# Patient Record
Sex: MALE | Race: WHITE | NOT HISPANIC OR LATINO | Employment: UNEMPLOYED | URBAN - NONMETROPOLITAN AREA
[De-identification: names, ages, dates, MRNs, and addresses within clinical notes are randomized per-mention and may not be internally consistent; named-entity substitution may affect disease eponyms.]

---

## 2020-06-12 ENCOUNTER — HOSPITAL ENCOUNTER (EMERGENCY)
Facility: HOSPITAL | Age: 40
Discharge: PSYCHIATRIC OR SUBSTANCE USE FACILITY | End: 2020-06-13
Attending: FAMILY MEDICINE | Admitting: FAMILY MEDICINE

## 2020-06-12 DIAGNOSIS — F10.920 ALCOHOLIC INTOXICATION WITHOUT COMPLICATION (HCC): Primary | ICD-10-CM

## 2020-06-12 PROCEDURE — 99285 EMERGENCY DEPT VISIT HI MDM: CPT

## 2020-06-12 RX ORDER — SODIUM CHLORIDE 0.9 % (FLUSH) 0.9 %
10 SYRINGE (ML) INJECTION AS NEEDED
Status: DISCONTINUED | OUTPATIENT
Start: 2020-06-12 | End: 2020-06-13 | Stop reason: HOSPADM

## 2020-06-13 ENCOUNTER — HOSPITAL ENCOUNTER (INPATIENT)
Facility: HOSPITAL | Age: 40
LOS: 1 days | Discharge: LEFT BEFORE DISCHARGE | End: 2020-06-14
Attending: PSYCHIATRY & NEUROLOGY | Admitting: PSYCHIATRY & NEUROLOGY

## 2020-06-13 VITALS
OXYGEN SATURATION: 98 % | RESPIRATION RATE: 19 BRPM | BODY MASS INDEX: 28 KG/M2 | SYSTOLIC BLOOD PRESSURE: 131 MMHG | WEIGHT: 200 LBS | DIASTOLIC BLOOD PRESSURE: 86 MMHG | HEIGHT: 71 IN | TEMPERATURE: 98.6 F | HEART RATE: 112 BPM

## 2020-06-13 DIAGNOSIS — F10.20 ALCOHOL USE DISORDER, SEVERE, DEPENDENCE (HCC): Primary | ICD-10-CM

## 2020-06-13 PROBLEM — F10.10 ALCOHOL ABUSE: Status: ACTIVE | Noted: 2020-06-13

## 2020-06-13 LAB
6-ACETYL MORPHINE: NEGATIVE
ALBUMIN SERPL-MCNC: 3.88 G/DL (ref 3.5–5.2)
ALBUMIN/GLOB SERPL: 0.9 G/DL
ALP SERPL-CCNC: 184 U/L (ref 39–117)
ALT SERPL W P-5'-P-CCNC: 75 U/L (ref 1–41)
AMPHET+METHAMPHET UR QL: NEGATIVE
ANION GAP SERPL CALCULATED.3IONS-SCNC: 15.5 MMOL/L (ref 5–15)
AST SERPL-CCNC: 173 U/L (ref 1–40)
BARBITURATES UR QL SCN: NEGATIVE
BASOPHILS # BLD AUTO: 0.13 10*3/MM3 (ref 0–0.2)
BASOPHILS NFR BLD AUTO: 2.3 % (ref 0–1.5)
BENZODIAZ UR QL SCN: NEGATIVE
BILIRUB SERPL-MCNC: 0.9 MG/DL (ref 0.2–1.2)
BILIRUB UR QL STRIP: NEGATIVE
BUN BLD-MCNC: 4 MG/DL (ref 6–20)
BUN/CREAT SERPL: 6.3 (ref 7–25)
BUPRENORPHINE SERPL-MCNC: NEGATIVE NG/ML
CALCIUM SPEC-SCNC: 8.9 MG/DL (ref 8.6–10.5)
CANNABINOIDS SERPL QL: NEGATIVE
CHLORIDE SERPL-SCNC: 100 MMOL/L (ref 98–107)
CLARITY UR: CLEAR
CO2 SERPL-SCNC: 22.5 MMOL/L (ref 22–29)
COCAINE UR QL: NEGATIVE
COLOR UR: YELLOW
CREAT BLD-MCNC: 0.64 MG/DL (ref 0.76–1.27)
DEPRECATED RDW RBC AUTO: 48.8 FL (ref 37–54)
EOSINOPHIL # BLD AUTO: 0.04 10*3/MM3 (ref 0–0.4)
EOSINOPHIL NFR BLD AUTO: 0.7 % (ref 0.3–6.2)
ERYTHROCYTE [DISTWIDTH] IN BLOOD BY AUTOMATED COUNT: 13.2 % (ref 12.3–15.4)
ETHANOL BLD-MCNC: 131 MG/DL (ref 0–10)
ETHANOL BLD-MCNC: 299 MG/DL (ref 0–10)
ETHANOL BLD-MCNC: 412 MG/DL (ref 0–10)
ETHANOL BLD-MCNC: 76 MG/DL (ref 0–10)
ETHANOL UR QL: 0.08 %
ETHANOL UR QL: 0.13 %
ETHANOL UR QL: 0.3 %
ETHANOL UR QL: 0.41 %
GFR SERPL CREATININE-BSD FRML MDRD: 138 ML/MIN/1.73
GLOBULIN UR ELPH-MCNC: 4.1 GM/DL
GLUCOSE BLD-MCNC: 100 MG/DL (ref 65–99)
GLUCOSE UR STRIP-MCNC: NEGATIVE MG/DL
HAV IGM SERPL QL IA: ABNORMAL
HBV CORE IGM SERPL QL IA: ABNORMAL
HBV SURFACE AG SERPL QL IA: ABNORMAL
HCT VFR BLD AUTO: 46.4 % (ref 37.5–51)
HCV AB SER DONR QL: REACTIVE
HGB BLD-MCNC: 15.9 G/DL (ref 13–17.7)
HGB UR QL STRIP.AUTO: NEGATIVE
HIV1+2 AB SER QL: REACTIVE
IMM GRANULOCYTES # BLD AUTO: 0.02 10*3/MM3 (ref 0–0.05)
IMM GRANULOCYTES NFR BLD AUTO: 0.4 % (ref 0–0.5)
KETONES UR QL STRIP: NEGATIVE
LEUKOCYTE ESTERASE UR QL STRIP.AUTO: NEGATIVE
LYMPHOCYTES # BLD AUTO: 2.87 10*3/MM3 (ref 0.7–3.1)
LYMPHOCYTES NFR BLD AUTO: 51.6 % (ref 19.6–45.3)
MAGNESIUM SERPL-MCNC: 1.9 MG/DL (ref 1.6–2.6)
MCH RBC QN AUTO: 34.3 PG (ref 26.6–33)
MCHC RBC AUTO-ENTMCNC: 34.3 G/DL (ref 31.5–35.7)
MCV RBC AUTO: 100 FL (ref 79–97)
METHADONE UR QL SCN: NEGATIVE
MONOCYTES # BLD AUTO: 0.66 10*3/MM3 (ref 0.1–0.9)
MONOCYTES NFR BLD AUTO: 11.9 % (ref 5–12)
NEUTROPHILS # BLD AUTO: 1.84 10*3/MM3 (ref 1.7–7)
NEUTROPHILS NFR BLD AUTO: 33.1 % (ref 42.7–76)
NITRITE UR QL STRIP: NEGATIVE
NRBC BLD AUTO-RTO: 0 /100 WBC (ref 0–0.2)
OPIATES UR QL: NEGATIVE
OXYCODONE UR QL SCN: NEGATIVE
PCP UR QL SCN: NEGATIVE
PH UR STRIP.AUTO: <=5 [PH] (ref 5–8)
PLATELET # BLD AUTO: 80 10*3/MM3 (ref 140–450)
PMV BLD AUTO: 12.7 FL (ref 6–12)
POTASSIUM BLD-SCNC: 3.7 MMOL/L (ref 3.5–5.2)
PROT SERPL-MCNC: 8 G/DL (ref 6–8.5)
PROT UR QL STRIP: ABNORMAL
RBC # BLD AUTO: 4.64 10*6/MM3 (ref 4.14–5.8)
SODIUM BLD-SCNC: 138 MMOL/L (ref 136–145)
SP GR UR STRIP: 1.01 (ref 1–1.03)
UROBILINOGEN UR QL STRIP: ABNORMAL
WBC NRBC COR # BLD: 5.56 10*3/MM3 (ref 3.4–10.8)

## 2020-06-13 PROCEDURE — 80307 DRUG TEST PRSMV CHEM ANLYZR: CPT | Performed by: EMERGENCY MEDICINE

## 2020-06-13 PROCEDURE — 80307 DRUG TEST PRSMV CHEM ANLYZR: CPT | Performed by: PHYSICIAN ASSISTANT

## 2020-06-13 PROCEDURE — 25010000002 MAGNESIUM SULFATE PER 500 MG OF MAGNESIUM: Performed by: PHYSICIAN ASSISTANT

## 2020-06-13 PROCEDURE — HZ2ZZZZ DETOXIFICATION SERVICES FOR SUBSTANCE ABUSE TREATMENT: ICD-10-PCS | Performed by: PSYCHIATRY & NEUROLOGY

## 2020-06-13 PROCEDURE — 25010000002 LORAZEPAM PER 2 MG: Performed by: NURSE PRACTITIONER

## 2020-06-13 PROCEDURE — 87535 HIV-1 PROBE&REVERSE TRNSCRPJ: CPT | Performed by: PSYCHIATRY & NEUROLOGY

## 2020-06-13 PROCEDURE — 83735 ASSAY OF MAGNESIUM: CPT | Performed by: PHYSICIAN ASSISTANT

## 2020-06-13 PROCEDURE — 96365 THER/PROPH/DIAG IV INF INIT: CPT

## 2020-06-13 PROCEDURE — 80053 COMPREHEN METABOLIC PANEL: CPT | Performed by: PHYSICIAN ASSISTANT

## 2020-06-13 PROCEDURE — 80074 ACUTE HEPATITIS PANEL: CPT | Performed by: PSYCHIATRY & NEUROLOGY

## 2020-06-13 PROCEDURE — 96375 TX/PRO/DX INJ NEW DRUG ADDON: CPT

## 2020-06-13 PROCEDURE — 96376 TX/PRO/DX INJ SAME DRUG ADON: CPT

## 2020-06-13 PROCEDURE — 85025 COMPLETE CBC W/AUTO DIFF WBC: CPT | Performed by: PHYSICIAN ASSISTANT

## 2020-06-13 PROCEDURE — 81003 URINALYSIS AUTO W/O SCOPE: CPT | Performed by: PHYSICIAN ASSISTANT

## 2020-06-13 PROCEDURE — G0432 EIA HIV-1/HIV-2 SCREEN: HCPCS | Performed by: PSYCHIATRY & NEUROLOGY

## 2020-06-13 PROCEDURE — 93005 ELECTROCARDIOGRAM TRACING: CPT | Performed by: PSYCHIATRY & NEUROLOGY

## 2020-06-13 PROCEDURE — 86702 HIV-2 ANTIBODY: CPT | Performed by: PSYCHIATRY & NEUROLOGY

## 2020-06-13 PROCEDURE — 86701 HIV-1ANTIBODY: CPT | Performed by: PSYCHIATRY & NEUROLOGY

## 2020-06-13 PROCEDURE — 25010000002 THIAMINE PER 100 MG: Performed by: PHYSICIAN ASSISTANT

## 2020-06-13 RX ORDER — BENZTROPINE MESYLATE 1 MG/1
2 TABLET ORAL ONCE AS NEEDED
Status: DISCONTINUED | OUTPATIENT
Start: 2020-06-13 | End: 2020-06-14 | Stop reason: HOSPADM

## 2020-06-13 RX ORDER — CHLORDIAZEPOXIDE HYDROCHLORIDE 25 MG/1
25 CAPSULE, GELATIN COATED ORAL 3 TIMES DAILY
Status: DISCONTINUED | OUTPATIENT
Start: 2020-06-14 | End: 2020-06-14

## 2020-06-13 RX ORDER — IBUPROFEN 400 MG/1
400 TABLET ORAL EVERY 6 HOURS PRN
Status: DISCONTINUED | OUTPATIENT
Start: 2020-06-13 | End: 2020-06-14 | Stop reason: HOSPADM

## 2020-06-13 RX ORDER — ONDANSETRON 4 MG/1
4 TABLET, FILM COATED ORAL EVERY 6 HOURS PRN
Status: DISCONTINUED | OUTPATIENT
Start: 2020-06-13 | End: 2020-06-14 | Stop reason: HOSPADM

## 2020-06-13 RX ORDER — BENZONATATE 100 MG/1
100 CAPSULE ORAL 3 TIMES DAILY PRN
Status: DISCONTINUED | OUTPATIENT
Start: 2020-06-13 | End: 2020-06-14 | Stop reason: HOSPADM

## 2020-06-13 RX ORDER — NICOTINE 21 MG/24HR
1 PATCH, TRANSDERMAL 24 HOURS TRANSDERMAL ONCE
Status: DISCONTINUED | OUTPATIENT
Start: 2020-06-13 | End: 2020-06-13 | Stop reason: HOSPADM

## 2020-06-13 RX ORDER — CHLORDIAZEPOXIDE HYDROCHLORIDE 5 MG/1
5 CAPSULE, GELATIN COATED ORAL ONCE
Status: DISCONTINUED | OUTPATIENT
Start: 2020-06-17 | End: 2020-06-13

## 2020-06-13 RX ORDER — ALUMINA, MAGNESIA, AND SIMETHICONE 2400; 2400; 240 MG/30ML; MG/30ML; MG/30ML
15 SUSPENSION ORAL EVERY 6 HOURS PRN
Status: DISCONTINUED | OUTPATIENT
Start: 2020-06-13 | End: 2020-06-14 | Stop reason: HOSPADM

## 2020-06-13 RX ORDER — HYDROXYZINE 50 MG/1
50 TABLET, FILM COATED ORAL EVERY 6 HOURS PRN
Status: DISCONTINUED | OUTPATIENT
Start: 2020-06-13 | End: 2020-06-14 | Stop reason: HOSPADM

## 2020-06-13 RX ORDER — CHLORDIAZEPOXIDE HYDROCHLORIDE 25 MG/1
25 CAPSULE, GELATIN COATED ORAL ONCE
Status: DISCONTINUED | OUTPATIENT
Start: 2020-06-14 | End: 2020-06-14

## 2020-06-13 RX ORDER — CHLORDIAZEPOXIDE HYDROCHLORIDE 25 MG/1
50 CAPSULE, GELATIN COATED ORAL ONCE
Status: COMPLETED | OUTPATIENT
Start: 2020-06-13 | End: 2020-06-13

## 2020-06-13 RX ORDER — ECHINACEA PURPUREA EXTRACT 125 MG
2 TABLET ORAL AS NEEDED
Status: DISCONTINUED | OUTPATIENT
Start: 2020-06-13 | End: 2020-06-14 | Stop reason: HOSPADM

## 2020-06-13 RX ORDER — NICOTINE 21 MG/24HR
1 PATCH, TRANSDERMAL 24 HOURS TRANSDERMAL
Status: DISCONTINUED | OUTPATIENT
Start: 2020-06-13 | End: 2020-06-14 | Stop reason: HOSPADM

## 2020-06-13 RX ORDER — BENZTROPINE MESYLATE 1 MG/ML
1 INJECTION INTRAMUSCULAR; INTRAVENOUS ONCE AS NEEDED
Status: DISCONTINUED | OUTPATIENT
Start: 2020-06-13 | End: 2020-06-14 | Stop reason: HOSPADM

## 2020-06-13 RX ORDER — CHLORDIAZEPOXIDE HYDROCHLORIDE 25 MG/1
25 CAPSULE, GELATIN COATED ORAL 3 TIMES DAILY
Status: DISCONTINUED | OUTPATIENT
Start: 2020-06-15 | End: 2020-06-13

## 2020-06-13 RX ORDER — CHLORDIAZEPOXIDE HYDROCHLORIDE 10 MG/1
10 CAPSULE, GELATIN COATED ORAL ONCE
Status: DISCONTINUED | OUTPATIENT
Start: 2020-06-17 | End: 2020-06-13

## 2020-06-13 RX ORDER — LORAZEPAM 2 MG/ML
2 INJECTION INTRAMUSCULAR ONCE
Status: COMPLETED | OUTPATIENT
Start: 2020-06-13 | End: 2020-06-13

## 2020-06-13 RX ORDER — CHLORDIAZEPOXIDE HYDROCHLORIDE 10 MG/1
10 CAPSULE, GELATIN COATED ORAL ONCE
Status: DISCONTINUED | OUTPATIENT
Start: 2020-06-17 | End: 2020-06-14

## 2020-06-13 RX ORDER — CHLORDIAZEPOXIDE HYDROCHLORIDE 10 MG/1
10 CAPSULE, GELATIN COATED ORAL 3 TIMES DAILY
Status: DISCONTINUED | OUTPATIENT
Start: 2020-06-15 | End: 2020-06-14

## 2020-06-13 RX ORDER — CHLORDIAZEPOXIDE HYDROCHLORIDE 5 MG/1
5 CAPSULE, GELATIN COATED ORAL ONCE
Status: DISCONTINUED | OUTPATIENT
Start: 2020-06-16 | End: 2020-06-14

## 2020-06-13 RX ORDER — CHLORDIAZEPOXIDE HYDROCHLORIDE 25 MG/1
25 CAPSULE, GELATIN COATED ORAL ONCE
Status: DISCONTINUED | OUTPATIENT
Start: 2020-06-14 | End: 2020-06-13

## 2020-06-13 RX ORDER — CHLORDIAZEPOXIDE HYDROCHLORIDE 25 MG/1
50 CAPSULE, GELATIN COATED ORAL ONCE
Status: DISCONTINUED | OUTPATIENT
Start: 2020-06-14 | End: 2020-06-13

## 2020-06-13 RX ORDER — CHLORDIAZEPOXIDE HYDROCHLORIDE 10 MG/1
10 CAPSULE, GELATIN COATED ORAL ONCE
Status: DISCONTINUED | OUTPATIENT
Start: 2020-06-18 | End: 2020-06-13

## 2020-06-13 RX ORDER — CHLORDIAZEPOXIDE HYDROCHLORIDE 25 MG/1
25 CAPSULE, GELATIN COATED ORAL ONCE
Status: COMPLETED | OUTPATIENT
Start: 2020-06-13 | End: 2020-06-13

## 2020-06-13 RX ORDER — LORAZEPAM 2 MG/ML
1 INJECTION INTRAMUSCULAR ONCE
Status: COMPLETED | OUTPATIENT
Start: 2020-06-13 | End: 2020-06-13

## 2020-06-13 RX ORDER — CHLORDIAZEPOXIDE HYDROCHLORIDE 10 MG/1
10 CAPSULE, GELATIN COATED ORAL ONCE
Status: DISCONTINUED | OUTPATIENT
Start: 2020-06-16 | End: 2020-06-14

## 2020-06-13 RX ORDER — LOPERAMIDE HYDROCHLORIDE 2 MG/1
2 CAPSULE ORAL
Status: DISCONTINUED | OUTPATIENT
Start: 2020-06-13 | End: 2020-06-14 | Stop reason: HOSPADM

## 2020-06-13 RX ORDER — FAMOTIDINE 20 MG/1
20 TABLET, FILM COATED ORAL 2 TIMES DAILY PRN
Status: DISCONTINUED | OUTPATIENT
Start: 2020-06-13 | End: 2020-06-14 | Stop reason: HOSPADM

## 2020-06-13 RX ORDER — ACETAMINOPHEN 325 MG/1
650 TABLET ORAL EVERY 6 HOURS PRN
Status: DISCONTINUED | OUTPATIENT
Start: 2020-06-13 | End: 2020-06-13

## 2020-06-13 RX ORDER — TRAZODONE HYDROCHLORIDE 50 MG/1
50 TABLET ORAL NIGHTLY PRN
Status: DISCONTINUED | OUTPATIENT
Start: 2020-06-13 | End: 2020-06-14 | Stop reason: HOSPADM

## 2020-06-13 RX ORDER — CHLORDIAZEPOXIDE HYDROCHLORIDE 10 MG/1
10 CAPSULE, GELATIN COATED ORAL 3 TIMES DAILY
Status: DISCONTINUED | OUTPATIENT
Start: 2020-06-16 | End: 2020-06-13

## 2020-06-13 RX ADMIN — CHLORDIAZEPOXIDE HYDROCHLORIDE 50 MG: 25 CAPSULE ORAL at 22:02

## 2020-06-13 RX ADMIN — NICOTINE 1 PATCH: 21 PATCH TRANSDERMAL at 00:37

## 2020-06-13 RX ADMIN — LORAZEPAM 1 MG: 2 INJECTION INTRAMUSCULAR; INTRAVENOUS at 15:15

## 2020-06-13 RX ADMIN — FOLIC ACID 1000 ML/HR: 5 INJECTION, SOLUTION INTRAMUSCULAR; INTRAVENOUS; SUBCUTANEOUS at 00:28

## 2020-06-13 RX ADMIN — NICOTINE 1 PATCH: 21 PATCH, EXTENDED RELEASE TRANSDERMAL at 10:20

## 2020-06-13 RX ADMIN — CHLORDIAZEPOXIDE HYDROCHLORIDE 25 MG: 25 CAPSULE ORAL at 15:14

## 2020-06-13 RX ADMIN — IBUPROFEN 400 MG: 400 TABLET, FILM COATED ORAL at 17:26

## 2020-06-13 RX ADMIN — HYDROXYZINE HYDROCHLORIDE 50 MG: 50 TABLET ORAL at 17:26

## 2020-06-13 RX ADMIN — LORAZEPAM 2 MG: 2 INJECTION INTRAMUSCULAR; INTRAVENOUS at 12:14

## 2020-06-13 RX ADMIN — TRAZODONE HYDROCHLORIDE 50 MG: 50 TABLET ORAL at 22:02

## 2020-06-13 RX ADMIN — CHLORDIAZEPOXIDE HYDROCHLORIDE 50 MG: 25 CAPSULE ORAL at 17:25

## 2020-06-13 NOTE — NURSING NOTE
Pt arrived in intake, searched with two staff members present in the ED by ED staff per report, pt's belongings placed in safe storage, safety precautions in place.

## 2020-06-13 NOTE — NURSING NOTE
Note that wallace lomax completed client admission database with my presence for orientation. Cosign complete.

## 2020-06-13 NOTE — NURSING NOTE
Spoke with Dr. Buckley, discussed assessment and labs, new orders to admit the patient to the detox unit with routine orders, SP4, librium detox protocol.  [id number]

## 2020-06-13 NOTE — NURSING NOTE
Pt presents to intake, seeking detox from alcohol, states he drinks 15/8 % beers daily for the last 5 years, without any sobriety.  States his last drink was yesterday evening around 2230, states his current craving level is 6/10.    Pt denies any SI/HI/AVH, also denies any other substance abuse.    Pt rates current depression 7/10 and anxiety 9/10.    Current CIWA is a 12.

## 2020-06-13 NOTE — ED PROVIDER NOTES
Subjective     Mental Health Problem   Presenting symptoms: no agitation, no depression, no hallucinations, no homicidal ideas, no self-mutilation, no suicidal thoughts, no suicidal threats and no suicide attempt    Patient accompanied by: Significant other.  Degree of incapacity (severity):  Moderate  Onset quality:  Gradual  Duration: 20 years.  Timing:  Constant  Progression:  Worsening  Chronicity:  Chronic  Context: alcohol use    Context: not drug abuse, not medication, not noncompliant, not recent medication change and not stressful life event    Context comment:  Drinks half a pack of beer per day; drank 8 beers today and last drink was on the way here  Treatment compliance:  Untreated  Time since last dose of psychoactive medication: Never.  Relieved by:  None tried  Worsened by:  Alcohol  Ineffective treatments:  None tried  Associated symptoms: anxiety and feelings of worthlessness    Associated symptoms: no abdominal pain and no chest pain    Risk factors: no hx of mental illness        Review of Systems   Constitutional: Negative.  Negative for fever.   HENT: Negative.    Respiratory: Negative.    Cardiovascular: Negative.  Negative for chest pain.   Gastrointestinal: Negative.  Negative for abdominal pain.   Endocrine: Negative.    Genitourinary: Negative.  Negative for dysuria.   Skin: Negative.    Neurological: Negative.    Psychiatric/Behavioral: Negative for agitation, behavioral problems, confusion, decreased concentration, dysphoric mood, hallucinations, homicidal ideas, self-injury, sleep disturbance and suicidal ideas. The patient is nervous/anxious. The patient is not hyperactive.    All other systems reviewed and are negative.      Past Medical History:   Diagnosis Date   • Alcohol abuse    • Alcoholism (CMS/HCC)    • Endocarditis    • Heart disease    • Hepatitis-C        No Known Allergies    Past Surgical History:   Procedure Laterality Date   • BACK SURGERY     • CARDIAC VALVE REPLACEMENT       two valves replaced    • KNEE SURGERY Right    • SHOULDER SURGERY Left        Family History   Problem Relation Age of Onset   • Depression Sister    • Suicide Attempts Sister        Social History     Socioeconomic History   • Marital status: Single     Spouse name: Not on file   • Number of children: Not on file   • Years of education: Not on file   • Highest education level: Not on file   Tobacco Use   • Smoking status: Current Every Day Smoker     Packs/day: 1.00     Types: Cigarettes   • Smokeless tobacco: Never Used   Substance and Sexual Activity   • Alcohol use: Yes     Comment: 12-15 beers daily   • Drug use: Not Currently   • Sexual activity: Yes     Partners: Female           Objective   Physical Exam   Constitutional: He is oriented to person, place, and time. He appears well-developed and well-nourished. No distress.   HENT:   Head: Normocephalic and atraumatic.   Right Ear: External ear normal.   Left Ear: External ear normal.   Nose: Nose normal.   Eyes: Pupils are equal, round, and reactive to light. Conjunctivae and EOM are normal.   Neck: Normal range of motion. Neck supple. No JVD present. No tracheal deviation present.   Cardiovascular: Normal rate, regular rhythm and normal heart sounds.   No murmur heard.  Pulmonary/Chest: Effort normal and breath sounds normal. No respiratory distress. He has no wheezes.   Abdominal: Soft. Bowel sounds are normal. There is no tenderness.   Musculoskeletal: Normal range of motion. He exhibits no edema or deformity.   Neurological: He is alert and oriented to person, place, and time. He displays normal reflexes. No cranial nerve deficit or sensory deficit. He exhibits normal muscle tone. Coordination normal.   Skin: Skin is warm and dry. No rash noted. He is not diaphoretic. No erythema. No pallor.   Psychiatric: He has a normal mood and affect. His behavior is normal. Thought content normal.   Nursing note and vitals reviewed.      Procedures           ED  Course  ED Course as of Jun 13 1618   Sat Jun 13, 2020   1600 Patient medically clear and stable for detox/psych evaluation.    [GABRIEL]      ED Course User Index  [GABRIEL] Dio Arzola, APRN                                           [facility]    Final diagnoses:   Alcoholic intoxication without complication (CMS/HCC)            Dio Arzola, APRN  06/13/20 8352

## 2020-06-13 NOTE — PLAN OF CARE
Problem: Patient Care Overview  Goal: Plan of Care Review  Outcome: Ongoing (interventions implemented as appropriate)  Flowsheets (Taken 6/13/2020 0334)  Plan of Care Reviewed With: patient  Patient Agreement with Plan of Care: agrees  Note:   New admit. Required order for sitter at bedside related to gross tremors and high fall risk. Client overestimates abilities and attempts to be independent. Librium detox protocol ordered. Human heart valve replacement history in 2017. Drinking 15 /24 ounce beers daily for five or more years. Pleasant, calm and cooperative.   Goal: Individualization and Mutuality  Outcome: Ongoing (interventions implemented as appropriate)  Goal: Discharge Needs Assessment  Outcome: Ongoing (interventions implemented as appropriate)  Goal: Interprofessional Rounds/Family Conf  Outcome: Ongoing (interventions implemented as appropriate)     Problem: Overarching Goals (Adult)  Goal: Adheres to Safety Considerations for Self and Others  Outcome: Ongoing (interventions implemented as appropriate)  Goal: Optimized Coping Skills in Response to Life Stressors  Outcome: Ongoing (interventions implemented as appropriate)  Goal: Develops/Participates in Therapeutic [place] to Support Successful Transition  Outcome: Ongoing (interventions implemented as appropriate)     Problem: Alcohol Withdrawal Acute, Risk/Actual (Adult)  Goal: Signs and Symptoms of Listed Potential Problems Will be Absent, Minimized or Managed (Alcohol Withdrawal Acute, Risk/Actual)  Outcome: Ongoing (interventions implemented as appropriate)     Problem: Fall Risk (Adult)  Goal: Identify Related Risk Factors and Signs and Symptoms  Outcome: Ongoing (interventions implemented as appropriate)  Goal: Absence of Fall  Outcome: Ongoing (interventions implemented as appropriate)

## 2020-06-14 VITALS
OXYGEN SATURATION: 98 % | TEMPERATURE: 96.9 F | BODY MASS INDEX: 26.64 KG/M2 | HEIGHT: 71 IN | SYSTOLIC BLOOD PRESSURE: 132 MMHG | WEIGHT: 190.3 LBS | RESPIRATION RATE: 18 BRPM | DIASTOLIC BLOOD PRESSURE: 93 MMHG | HEART RATE: 90 BPM

## 2020-06-14 PROBLEM — F10.20 ALCOHOL USE DISORDER, SEVERE, DEPENDENCE (HCC): Status: ACTIVE | Noted: 2020-06-13

## 2020-06-14 LAB — HOLD SPECIMEN: NORMAL

## 2020-06-14 PROCEDURE — 99236 HOSP IP/OBS SAME DATE HI 85: CPT | Performed by: PSYCHIATRY & NEUROLOGY

## 2020-06-14 RX ORDER — CHLORDIAZEPOXIDE HYDROCHLORIDE 10 MG/1
10 CAPSULE, GELATIN COATED ORAL 3 TIMES DAILY
Status: DISCONTINUED | OUTPATIENT
Start: 2020-06-16 | End: 2020-06-14 | Stop reason: HOSPADM

## 2020-06-14 RX ORDER — CHLORDIAZEPOXIDE HYDROCHLORIDE 25 MG/1
CAPSULE, GELATIN COATED ORAL
Qty: 6 CAPSULE | Refills: 0 | OUTPATIENT
Start: 2020-06-14

## 2020-06-14 RX ORDER — CHLORDIAZEPOXIDE HYDROCHLORIDE 25 MG/1
25 CAPSULE, GELATIN COATED ORAL ONCE
Status: COMPLETED | OUTPATIENT
Start: 2020-06-14 | End: 2020-06-14

## 2020-06-14 RX ORDER — CHLORDIAZEPOXIDE HYDROCHLORIDE 5 MG/1
5 CAPSULE, GELATIN COATED ORAL ONCE
Status: DISCONTINUED | OUTPATIENT
Start: 2020-06-17 | End: 2020-06-14 | Stop reason: HOSPADM

## 2020-06-14 RX ORDER — CHLORDIAZEPOXIDE HYDROCHLORIDE 10 MG/1
10 CAPSULE, GELATIN COATED ORAL ONCE
Status: DISCONTINUED | OUTPATIENT
Start: 2020-06-17 | End: 2020-06-14 | Stop reason: HOSPADM

## 2020-06-14 RX ORDER — CHLORDIAZEPOXIDE HYDROCHLORIDE 10 MG/1
10 CAPSULE, GELATIN COATED ORAL ONCE
Status: DISCONTINUED | OUTPATIENT
Start: 2020-06-18 | End: 2020-06-14 | Stop reason: HOSPADM

## 2020-06-14 RX ORDER — CHLORDIAZEPOXIDE HYDROCHLORIDE 25 MG/1
CAPSULE, GELATIN COATED ORAL
Qty: 6 CAPSULE | Refills: 0 | OUTPATIENT
Start: 2020-06-14 | End: 2022-03-01

## 2020-06-14 RX ORDER — CHLORDIAZEPOXIDE HYDROCHLORIDE 25 MG/1
25 CAPSULE, GELATIN COATED ORAL 3 TIMES DAILY
Status: DISCONTINUED | OUTPATIENT
Start: 2020-06-15 | End: 2020-06-14 | Stop reason: HOSPADM

## 2020-06-14 RX ORDER — CHLORDIAZEPOXIDE HYDROCHLORIDE 25 MG/1
50 CAPSULE, GELATIN COATED ORAL ONCE
Status: DISCONTINUED | OUTPATIENT
Start: 2020-06-14 | End: 2020-06-14 | Stop reason: HOSPADM

## 2020-06-14 RX ORDER — CHLORDIAZEPOXIDE HYDROCHLORIDE 25 MG/1
50 CAPSULE, GELATIN COATED ORAL ONCE
Status: COMPLETED | OUTPATIENT
Start: 2020-06-14 | End: 2020-06-14

## 2020-06-14 RX ADMIN — HYDROXYZINE HYDROCHLORIDE 50 MG: 50 TABLET ORAL at 14:37

## 2020-06-14 RX ADMIN — CHLORDIAZEPOXIDE HYDROCHLORIDE 50 MG: 25 CAPSULE ORAL at 08:34

## 2020-06-14 RX ADMIN — CHLORDIAZEPOXIDE HYDROCHLORIDE 25 MG: 25 CAPSULE ORAL at 14:37

## 2020-06-14 RX ADMIN — IBUPROFEN 400 MG: 400 TABLET, FILM COATED ORAL at 00:59

## 2020-06-14 RX ADMIN — HYDROXYZINE HYDROCHLORIDE 50 MG: 50 TABLET ORAL at 08:33

## 2020-06-14 RX ADMIN — IBUPROFEN 400 MG: 400 TABLET, FILM COATED ORAL at 14:37

## 2020-06-14 RX ADMIN — IBUPROFEN 400 MG: 400 TABLET, FILM COATED ORAL at 08:33

## 2020-06-14 NOTE — H&P
INITIAL PSYCHIATRIC HISTORY & PHYSICAL    Patient Identification:  Name:   Trent Tapia  Age:   39 y.o.  Sex:   male  :   1980  MRN:   [id number]  Visit Number:   [id number]  Primary Care Physician:   Provider, No Known    SUBJECTIVE    CC/Focus of Exam: Alcohol Abuse    HPI: Trent Tapia is a 39 y.o. male who was admitted on 2020 with complaints of alcohol abuse. Patient presents to the emergency department seeking detoxification from alcohol. Patient states that he has been drinking 15 beers daily for the past 5 years without any periods of sobriety. Patients reports his last drink was yesterday evening around. Patient states his current craving level is a 6 on a scale of 1/10 with 10 being the most severe. Patient denies having any withdrawal symptoms at this time or any history of seizures with withdrawals.  Patient denies any homicidal or suicidal ideations. Patient denies any auditory or visuall hallucinations. Patient denies any other substance abuse. Patient reports both appetite and sleep as good. Patient rates anxiety as a 9 on a scale of 1/10 with 10 being the most severe. Patient rates depression as a 7 on a scale of 1/10 with 10 being the most severe. CIWA was a 12 upon intake. Patient has been admitted to the detoxification unit for further safety and stabilization.      Hep-C and HIV reactive.    Available medical/psychiatric records reviewed and incorporated into the current document.     PAST PSYCHIATRIC HX: Patient denies any history of inpatient psychiatric admissions. Patient denies any current outpatient therapy or treatment.      SUBSTANCE USE HX: UDS was negative upon initial intake. Patients ethanol level was 0.412 upon initial intake. See HPI for current use.     SOCIAL HX: Patient was born and raised in [place], [place] and currently resides in [place], [place]. He is single but reports having a fiance and three children-ages 20, 12, and 11 that all  live with their mother. Highest level of education is high school diploma. Patient is currently employed by [employer] and fears he may have lost his job due to his alcohol abuse. Patient reports tobacco use of 1.5 packs daily. Patient denies any history of abuse.     Past Medical History:   Diagnosis Date   • Alcohol abuse    • Alcoholism (CMS/HCC)    • Endocarditis    • Heart disease    • Hepatitis-C        Past Surgical History:   Procedure Laterality Date   • BACK SURGERY     • CARDIAC VALVE REPLACEMENT      two valves replaced    • KNEE SURGERY Right    • SHOULDER SURGERY Left        Family History   Problem Relation Age of Onset   • Depression Sister    • Suicide Attempts Sister          No medications prior to admission.           ALLERGIES:  Patient has no known allergies.    Temp:  [97.2 °F (36.2 °C)-98.6 °F (37 °C)] 97.6 °F (36.4 °C)  Heart Rate:  [] 96  Resp:  [18-20] 18  BP: ()/(57-98) 134/90    REVIEW OF SYSTEMS:  Review of Systems   Neurological: Positive for tremors.   Psychiatric/Behavioral: The patient is nervous/anxious.    All other systems reviewed and are negative.     See HPI for psychiatric ROS  OBJECTIVE    PHYSICAL EXAM:  Physical Exam   Constitutional: He is oriented to person, place, and time. He appears well-developed and well-nourished.   HENT:   Head: Normocephalic and atraumatic.   Right Ear: External ear normal.   Left Ear: External ear normal.   Nose: Nose normal.   Mouth/Throat: Oropharynx is clear and moist.   Eyes: Pupils are equal, round, and reactive to light. EOM are normal.   Neck: Normal range of motion. Neck supple.   Cardiovascular: Normal rate.   Pulmonary/Chest: Effort normal and breath sounds normal. No respiratory distress.   Abdominal: Soft. He exhibits no distension.   Musculoskeletal: Normal range of motion. He exhibits no deformity.   Neurological: He is alert and oriented to person, place, and time. Coordination normal.   Skin: Skin is warm. No  rash noted.   Nursing note and vitals reviewed.      MENTAL STATUS EXAM:     Hygiene:   good  Cooperation:  Cooperative  Eye Contact:  Good  Psychomotor Behavior:  Appropriate  Affect:  Appropriate  Hopelessness: Denies  Speech:  Normal  Thought Progress:  Goal directed  Thought Content:  Normal  Suicidal:  None  Homicidal:  None  Hallucinations:  None  Delusion:  None  Memory:  Intact  Orientation:  Person, Place and Time  Reliability:  good  Insight:  Fair  Judgement:  Fair  Impulse Control:  Fair      Imaging Results (Last 24 Hours)     ** No results found for the last 24 hours. **           ECG/EMG Results (most recent)     Procedure Component Value Units Date/Time    ECG 12 Lead [[id number]] Collected:  06/14/20 0124     Updated:  06/14/20 0128    Narrative:       Test Reason : Baseline Cardiac Status  Blood Pressure : **/** mmHG  Vent. Rate : 074 BPM     Atrial Rate : 074 BPM     P-R Int : 194 ms          QRS Dur : 094 ms      QT Int : 408 ms       P-R-T Axes : 033 045 027 degrees     QTc Int : 452 ms    Normal sinus rhythm  T wave abnormality, consider anterior ischemia  Abnormal ECG  No previous ECGs available    Referred By:  DUGLAS           Confirmed By:            Lab Results   Component Value Date    GLUCOSE 100 (H) 06/13/2020    BUN 4 (L) 06/13/2020    CREATININE 0.64 (L) 06/13/2020    EGFRIFNONA 138 06/13/2020    BCR 6.3 (L) 06/13/2020    CO2 22.5 06/13/2020    CALCIUM 8.9 06/13/2020    ALBUMIN 3.88 06/13/2020     (H) 06/13/2020    ALT 75 (H) 06/13/2020       Lab Results   Component Value Date    WBC 5.56 06/13/2020    HGB 15.9 06/13/2020    HCT 46.4 06/13/2020    .0 (H) 06/13/2020    PLT 80 (L) 06/13/2020       Pain Management Panel     Pain Management Panel Latest Ref Rng & Units 6/13/2020    AMPHETAMINES SCREEN, URINE Negative Negative    BARBITURATES SCREEN Negative Negative    BENZODIAZEPINE SCREEN, URINE Negative Negative    BUPRENORPHINEUR Negative Negative    COCAINE SCREEN, URINE  Negative Negative    METHADONE SCREEN, URINE Negative Negative          Brief Urine Lab Results  (Last result in the past 365 days)      Color   Clarity   Blood   Leuk Est   Nitrite   Protein   CREAT   Urine HCG        06/13/20 0038 Yellow Clear Negative Negative Negative Trace               Reviewed labs and studies done with this admission.       ASSESSMENT & PLAN:        Alcohol abuse    Alcohol use disorder, severe, dependence  -Patient with significant alcohol dependence.  Admit for medically assisted detox  -Begin Librium detox  -Discussed outpatient follow-up plans    Elevated transaminases  -ALT/AST elevated at 75/173  -Hepatitis panel positive for hepatitis C.  Patient has completed treatment previously  -Likely related to alcohol abuse    Hepatitis C  -Patient completed treatment course last year per his report  -Deferred to primary care physician    HIV  -Initial screen reactive  -Confirmatory panel pending    The patient has been admitted for safety and stabilization.  Patient will be monitored for suicidality daily and maintained on Special Precautions Level 4 (q30 min checks).  The patient will have individual and group therapy with a master's level therapist. A master treatment plan will be developed and agreed upon by the patient and his/her treatment team.  The patient's estimated length of stay in the hospital is 5-7 days.     Scribed by Abby Hernandez MA, 06/14/20 9:07 AM, acting as scribe for Eddie Escobedo MD.

## 2020-06-14 NOTE — PLAN OF CARE
Problem: Patient Care Overview  Goal: Plan of Care Review  Outcome: Ongoing (interventions implemented as appropriate)  Flowsheets (Taken 6/14/2020 0669)  Progress: improving  Plan of Care Reviewed With: patient  Patient Agreement with Plan of Care: agrees  Goal: Individualization and Mutuality  Outcome: Ongoing (interventions implemented as appropriate)  Goal: Discharge Needs Assessment  Outcome: Ongoing (interventions implemented as appropriate)  Goal: Interprofessional Rounds/Family Conf  Outcome: Ongoing (interventions implemented as appropriate)     Problem: Overarching Goals (Adult)  Goal: Adheres to Safety Considerations for Self and Others  Outcome: Ongoing (interventions implemented as appropriate)  Goal: Optimized Coping Skills in Response to Life Stressors  Outcome: Ongoing (interventions implemented as appropriate)  Goal: Develops/Participates in Therapeutic [place] to Support Successful Transition  Outcome: Ongoing (interventions implemented as appropriate)    Pt in room for entire shift except for medications.Reports anxiety 7/10 and cravings 7/10.   Tremors +4 and sitter still necessary for unsteady gait. Appetite is good and sleep is fair.

## 2020-06-14 NOTE — NURSING NOTE
Patient requested to discharged. He expressed concerns about losing his job. Patient was informed of the risks of leaving against medical advice. He was also educated on the benefits of continuing treatment.  He understood the risk and benefits, but insisted upon leaving.

## 2020-06-14 NOTE — PROGRESS NOTES
1400  Data:  Met with Dr. Escobedo to discuss patients request to leave AMA, he reports the patient became upset after being informed that he screened positive for HIV. He was told this is not a confirmed diagnosis and we are awaiting additional testing. Dr. Escobedo and I met with the patient to discuss risks of leaving AMA and not completing his detox, he still asked to leave AMA. He was agreeable for contact with his wife and signed consent for contact.  Dr. Escobedo put in a order for his wife to come in for a family session and safety planning. The patient does not want his test results to be discussed with her.   Contacted patients wife Minoo by phone she was agreeable for family meeting, provided instructions for entering the hospital.     Met with patient and his wife, we discussed risks associated with him leaving AMA and not completing his detox and the importance of follow up care, she was agreeable and understood the concerns. She encouraged him to stay and he refused. She does not have any concerns that the patient would be at risk of self harm, there are no firearms in the home and she plans to stay off work and stay with him. They discussed [place] for follow up care and agreed to arrange follow when they return home.     Discussed my contact with patients wife with Dr. Escobedo. Patient wants to be discharged home and his wife is agreeable to take him home and understands recommendations for follow up care.

## 2020-06-14 NOTE — PLAN OF CARE
Problem: Patient Care Overview  Goal: Plan of Care Review  Outcome: Ongoing (interventions implemented as appropriate)  Flowsheets (Taken 6/14/2020 0038)  Progress: no change  Plan of Care Reviewed With: patient  Patient Agreement with Plan of Care: agrees  Outcome Summary: Patient reports appetite as good; reports trouble sleeping; reports anxiety as 7; denies depression; pt denies any SI, HI or hallucinations; pt reports that he plans to return home to [facility] upon discharge; pt worried about being unsteady; pt assisted with tasks by sitter; pt reports cravings as 7; pt reports withdrawal symptoms as anxiety; tremors, headache and craving; will continue to monitor patient

## 2020-06-15 NOTE — PROGRESS NOTES
Behavioral Health Discharge Summary             Please fax within 24 hours of discharge to [facility] at: [phone]      Member Name: Trent Tapia Member ID: [id number]   Authorization Number: [id number] Phone: [phone]   Member Address: [address] [place] [id number]   Discharge Date: 06/14/2020 Level of Care at Discharge:    Facility: [facility] Staff Completing Form: Giovanna TAPIA RN    If the member is being discharged directly to a residential or extended care program, please specify the type below.   __Private Child-Caring Facility (PCC) Residential/Group Home   __Private Child-Caring Facility (PCC) Therapeutic Foster Care   __Residential Treatment Facility (RTF)   __Psychiatric Residential Treatment Facility (PRTF I or II)   __Long-Term Acute Inpatient Hospital Services or Extended Care Unit (ECU)   __Other (please specify):    Brief discharge summary of treatment received (for follow up by the case management team): D/C clinical with list of medications and follow up appts given to patient upon discharge.     BRIEF SUMMARY OF RECOMMENDATIONS FOR ONGOING TREATMENT     Discharged to where: Home LEFT AMA    Discharge diagnoses: F 10.20   Axis I:    Axis II:    Axis III:    Axis IV:    Axis V:    Does the member understand his/her DX?  Yes          Medication     Dose     Schedule Supply/  Quantity  Given at Discharge RX Provided  Yes/No  If Rx Provided, Quantity RX Prior Auth Required  Yes/No Prior Auth  Completed   librium 25 mg  TID                                                                                         Does the member understand the reason for taking these medications? Yes                                                           FOLLOW-UP APPOINTMENTS   Please schedule within 7 days of discharge and provide appointment details for all referred services.    PCP/Other Providers Involved in Treatment:    Appointment Type:  PT LEFT AMA  Provider Name:  Provider Phone:  Appointment Date:  Appointment Time:      Assessment   (new to OP services)        Case Management    Is the member already enrolled in case management?  Yes/No  If yes, date the CM was notified:    If no, was the CM referral offered?  Yes/No  Accepted? Yes/No    Is the Release of Information in the chart? Yes/No:      Medication Management (for member discharged with psychiatric medications):      A&D Treatment (for member with substance abuse/   dependence in the past year):      Medical Condition (for member with a medical condition):    Other recommended treatment:    Do you have any concerns about the discharge plan?  No    If yes, explain:    Was the member involved in the discharge planning?  Yes    If no, explain:    Was a copy of the discharge plan provided to the member?  Yes    If no, explain:

## 2020-06-18 LAB
DIAGNOSTIC IMP SPEC-IMP: NORMAL
HIV 1 & 2 AB SERPLBLD IA.RAPID: ABNORMAL
HIV 2 AB SERPLBLD QL IA.RAPID: ABNORMAL
HIV1 AB SERPLBLD QL IA.RAPID: NEGATIVE
HIV1 RNA SERPL QL NAA+PROBE: NEGATIVE

## 2020-06-19 NOTE — DISCHARGE SUMMARY
"      PSYCHIATRIC DISCHARGE SUMMARY     Patient Identification:  Name:  Trent Tapia  Age:  39 y.o.  Sex:  male  :  1980  MRN:  [id number]  Visit Number:  [id number]    Date of Admission:2020   Date of Discharge: 2020     Discharge Diagnosis:  Active Problems:    Alcohol use disorder, severe, dependence (CMS/[facility])      Admission Diagnosis:  Alcohol abuse [F10.10]     Hospital Course  Patient is a 39 y.o. male presented with alcohol dependence.  Admitted for medically assisted detox.  Patient was reluctant about treatment but encouraged by his wife to attend so he was agreeable.  Started on Librium detox.  History of hepatitis C, which patient was aware.  HIV screen came back as reactive, which was a surprise to the patient.  Within the hour, patient changed his mind and adamantly demanded to discharge.  Asked if this was related to HIV test, patient said no and he just wants to leave.  Spent well over an hour engaging patient on implications of testing, need for follow-up confirmatory testing, risk of leaving hospital prior to completion of detox, as well as extensive evaluation of mood and suicidal ideation.  Patient remained adamant about leaving and was agreeable for us to meet with his wife, as he had already called her to come pick them up before demanding to leave.  Our team met with patient and his wife.  Wife contracted for safety and said she would be able to stay with patient for the next few days for any concerns.  Per patient request, we did not discuss HIV screen with the wife.  Again, we encouraged patient to wait until confirmatory testing came back before making any decisions based on this screening.  He was difficult to converse with at times, answering many comments and questions with, \"I just want to go home.  I am not suicidal.\"  Extensive conversation was had with patient and his wife, and they were agreeable to an AMA discharge.  Short taper of Librium was provided to " "patient so that he would have time to get to his PCP in the next 2 days.    Following discharge, patient confirmatory HIV testing returned.  HIV-1 RNA was negative and HIV 2 was indeterminant and further testing was recommended.    On the day of discharge, patient denied SI, HI or AVH.      Mental Status Exam upon discharge:   Mood \" I want to leave\"   Affect-congruent, appropriate, stable  Thought Content-goal directed, no delusional material present  Thought process-linear, organized.  Suicidality: No SI  Homicidality: No HI  Perception: No AH/VH    Procedures Performed         Consults:   Consults     No orders found from 5/15/2020 to 6/14/2020.          Pertinent Test Results:   Lab Results (last 7 days)     Procedure Component Value Units Date/Time    Hepatitis Panel, Acute [[id number]] Collected:  06/13/20 1727    Specimen:  Blood Updated:  06/14/20 0532    Narrative:       The following orders were created for panel order Hepatitis Panel, Acute.  Procedure                               Abnormality         Status                     ---------                               -----------         ------                     Hepatitis Panel, Acute[[id number]]       Abnormal            Final result               Hep B Confirmation Tube[[id number]]                          Final result                 Please view results for these tests on the individual orders.    Hep B Confirmation Tube [[id number]] Collected:  06/13/20 1727    Specimen:  Blood Updated:  06/14/20 0532     Extra Tube Hold for add-ons.     Comment: Auto resulted.       HIV-1 / O / 2 Ag / Antibody 4th Generation [[id number]]  (Abnormal) Collected:  06/13/20 1727    Specimen:  Blood Updated:  06/13/20 1900     HIV-1/ HIV-2 Reactive    Narrative:       The HIV antibody/antigen combo assay is a qualitative assay for HIV that includes the p24 antigen as well as antibodies to HIV types 1 and 2. This test is intended to be used as a screening assay in the " diagnosis of HIV infection in patients over the age of 2.  Results may be falsely decreased if patient taking Biotin.      Hepatitis Panel, Acute [[id number]]  (Abnormal) Collected:  06/13/20 1727    Specimen:  Blood Updated:  06/13/20 1859     Hepatitis B Surface Ag Non-Reactive     Hep A IgM Non-Reactive     Hep B C IgM Non-Reactive     Hepatitis C Ab Reactive    Narrative:       Results may be falsely decreased if patient taking Biotin.           Condition on Discharge:  guarded    Vital Signs       Discharge Disposition:  Left Against Medical Advice    Discharge Medications:     Discharge Medications      New Medications      Instructions Start Date   chlordiazePOXIDE 25 MG capsule  Commonly known as:  LIBRIUM   Take one capsule three times today, twice tomorrow and once the following day             Discharge Diet: Normal  Diet Instructions     Advance as tolerated.                Activity at Discharge: Normal  Activity Instructions     As tolerated.                Follow-up Appointments  No future appointments.      Test Results Pending at Discharge  None     Clinician:   Eddie Escobedo MD  06/19/20  12:44

## 2022-03-01 ENCOUNTER — HOSPITAL ENCOUNTER (EMERGENCY)
Facility: HOSPITAL | Age: 42
Discharge: HOME | End: 2022-03-01
Attending: STUDENT IN AN ORGANIZED HEALTH CARE EDUCATION/TRAINING PROGRAM | Admitting: STUDENT IN AN ORGANIZED HEALTH CARE EDUCATION/TRAINING PROGRAM

## 2022-03-01 VITALS
OXYGEN SATURATION: 98 % | DIASTOLIC BLOOD PRESSURE: 68 MMHG | BODY MASS INDEX: 26.55 KG/M2 | RESPIRATION RATE: 18 BRPM | HEIGHT: 72 IN | WEIGHT: 196 LBS | SYSTOLIC BLOOD PRESSURE: 129 MMHG | HEART RATE: 76 BPM | TEMPERATURE: 98.2 F

## 2022-03-01 DIAGNOSIS — U07.1 COVID-19 VIRUS INFECTION: ICD-10-CM

## 2022-03-01 DIAGNOSIS — F10.10 ALCOHOL ABUSE: Primary | ICD-10-CM

## 2022-03-01 LAB
ALBUMIN SERPL-MCNC: 3.4 G/DL (ref 3.5–5.2)
ALBUMIN/GLOB SERPL: 0.9 G/DL
ALP SERPL-CCNC: 260 U/L (ref 39–117)
ALT SERPL W P-5'-P-CCNC: 15 U/L (ref 1–41)
AMPHET+METHAMPHET UR QL: NEGATIVE
AMPHETAMINES UR QL: NEGATIVE
ANION GAP SERPL CALCULATED.3IONS-SCNC: 8.7 MMOL/L (ref 5–15)
AST SERPL-CCNC: 30 U/L (ref 1–40)
BARBITURATES UR QL SCN: NEGATIVE
BASOPHILS # BLD AUTO: 0.04 10*3/MM3 (ref 0–0.2)
BASOPHILS NFR BLD AUTO: 0.9 % (ref 0–1.5)
BENZODIAZ UR QL SCN: POSITIVE
BILIRUB SERPL-MCNC: 1.4 MG/DL (ref 0–1.2)
BILIRUB UR QL STRIP: NEGATIVE
BUN SERPL-MCNC: 5 MG/DL (ref 6–20)
BUN/CREAT SERPL: 6.4 (ref 7–25)
BUPRENORPHINE SERPL-MCNC: NEGATIVE NG/ML
CALCIUM SPEC-SCNC: 8.7 MG/DL (ref 8.6–10.5)
CANNABINOIDS SERPL QL: POSITIVE
CHLORIDE SERPL-SCNC: 105 MMOL/L (ref 98–107)
CLARITY UR: CLEAR
CO2 SERPL-SCNC: 24.3 MMOL/L (ref 22–29)
COCAINE UR QL: NEGATIVE
COLOR UR: YELLOW
CREAT SERPL-MCNC: 0.78 MG/DL (ref 0.76–1.27)
DEPRECATED RDW RBC AUTO: 49.4 FL (ref 37–54)
EGFRCR SERPLBLD CKD-EPI 2021: 113.5 ML/MIN/1.73
EOSINOPHIL # BLD AUTO: 0.04 10*3/MM3 (ref 0–0.4)
EOSINOPHIL NFR BLD AUTO: 0.9 % (ref 0.3–6.2)
ERYTHROCYTE [DISTWIDTH] IN BLOOD BY AUTOMATED COUNT: 14.4 % (ref 12.3–15.4)
ETHANOL BLD-MCNC: <10 MG/DL (ref 0–10)
ETHANOL UR QL: <0.01 %
FLUAV RNA RESP QL NAA+PROBE: NOT DETECTED
FLUBV RNA RESP QL NAA+PROBE: NOT DETECTED
GLOBULIN UR ELPH-MCNC: 4 GM/DL
GLUCOSE SERPL-MCNC: 95 MG/DL (ref 65–99)
GLUCOSE UR STRIP-MCNC: NEGATIVE MG/DL
HCT VFR BLD AUTO: 40.3 % (ref 37.5–51)
HGB BLD-MCNC: 13.1 G/DL (ref 13–17.7)
HGB UR QL STRIP.AUTO: NEGATIVE
HOLD SPECIMEN: NORMAL
HOLD SPECIMEN: NORMAL
IMM GRANULOCYTES # BLD AUTO: 0.01 10*3/MM3 (ref 0–0.05)
IMM GRANULOCYTES NFR BLD AUTO: 0.2 % (ref 0–0.5)
KETONES UR QL STRIP: NEGATIVE
LEUKOCYTE ESTERASE UR QL STRIP.AUTO: NEGATIVE
LYMPHOCYTES # BLD AUTO: 1.45 10*3/MM3 (ref 0.7–3.1)
LYMPHOCYTES NFR BLD AUTO: 31.7 % (ref 19.6–45.3)
MAGNESIUM SERPL-MCNC: 1.7 MG/DL (ref 1.6–2.6)
MCH RBC QN AUTO: 30.4 PG (ref 26.6–33)
MCHC RBC AUTO-ENTMCNC: 32.5 G/DL (ref 31.5–35.7)
MCV RBC AUTO: 93.5 FL (ref 79–97)
METHADONE UR QL SCN: NEGATIVE
MONOCYTES # BLD AUTO: 0.79 10*3/MM3 (ref 0.1–0.9)
MONOCYTES NFR BLD AUTO: 17.3 % (ref 5–12)
NEUTROPHILS NFR BLD AUTO: 2.24 10*3/MM3 (ref 1.7–7)
NEUTROPHILS NFR BLD AUTO: 49 % (ref 42.7–76)
NITRITE UR QL STRIP: NEGATIVE
NRBC BLD AUTO-RTO: 0 /100 WBC (ref 0–0.2)
OPIATES UR QL: NEGATIVE
OXYCODONE UR QL SCN: NEGATIVE
PCP UR QL SCN: NEGATIVE
PH UR STRIP.AUTO: >=9 [PH] (ref 5–8)
PLATELET # BLD AUTO: 75 10*3/MM3 (ref 140–450)
PMV BLD AUTO: 12 FL (ref 6–12)
POTASSIUM SERPL-SCNC: 3.9 MMOL/L (ref 3.5–5.2)
PROPOXYPH UR QL: NEGATIVE
PROT SERPL-MCNC: 7.4 G/DL (ref 6–8.5)
PROT UR QL STRIP: NEGATIVE
RBC # BLD AUTO: 4.31 10*6/MM3 (ref 4.14–5.8)
SARS-COV-2 RNA RESP QL NAA+PROBE: DETECTED
SODIUM SERPL-SCNC: 138 MMOL/L (ref 136–145)
SP GR UR STRIP: 1.02 (ref 1–1.03)
TRICYCLICS UR QL SCN: NEGATIVE
UROBILINOGEN UR QL STRIP: ABNORMAL
WBC NRBC COR # BLD: 4.57 10*3/MM3 (ref 3.4–10.8)
WHOLE BLOOD HOLD SPECIMEN: NORMAL
WHOLE BLOOD HOLD SPECIMEN: NORMAL

## 2022-03-01 PROCEDURE — 99284 EMERGENCY DEPT VISIT MOD MDM: CPT

## 2022-03-01 PROCEDURE — 87636 SARSCOV2 & INF A&B AMP PRB: CPT | Performed by: PHYSICIAN ASSISTANT

## 2022-03-01 PROCEDURE — 81003 URINALYSIS AUTO W/O SCOPE: CPT | Performed by: PHYSICIAN ASSISTANT

## 2022-03-01 PROCEDURE — 85025 COMPLETE CBC W/AUTO DIFF WBC: CPT | Performed by: PHYSICIAN ASSISTANT

## 2022-03-01 PROCEDURE — C9803 HOPD COVID-19 SPEC COLLECT: HCPCS

## 2022-03-01 PROCEDURE — 80053 COMPREHEN METABOLIC PANEL: CPT | Performed by: PHYSICIAN ASSISTANT

## 2022-03-01 PROCEDURE — 83735 ASSAY OF MAGNESIUM: CPT | Performed by: PHYSICIAN ASSISTANT

## 2022-03-01 PROCEDURE — 82077 ASSAY SPEC XCP UR&BREATH IA: CPT | Performed by: PHYSICIAN ASSISTANT

## 2022-03-01 PROCEDURE — 80306 DRUG TEST PRSMV INSTRMNT: CPT | Performed by: PHYSICIAN ASSISTANT

## 2022-03-01 PROCEDURE — 36415 COLL VENOUS BLD VENIPUNCTURE: CPT

## 2022-03-01 NOTE — NURSING NOTE
Lab called. Patient positive for covid.     Per patient, he has not had or been diagnosed with covid.

## 2022-03-01 NOTE — ED PROVIDER NOTES
Subjective   41-year-old male who presents to the ED today for detox evaluation.  He states he needs detox from alcohol.  He states he drinks up to 18 beers plus a pint of liquor per day.  He states his last drink was at 9 PM last night.  He was brought from a rehab facility today for a detox evaluation.  He states currently he feels shaky and anxious and has had some nausea.  He denies any suicidal ideations.  He states he does use marijuana but denies any other drug use.      History provided by:  Patient  Drug / Alcohol Assessment  Primary symptoms comment: requesting detox. This is a new problem. The current episode started 12 to 24 hours ago. The problem has been gradually worsening. Suspected agents include alcohol. Associated symptoms include nausea. Associated medical issues include addiction treatment.       Review of Systems   Constitutional: Negative.    HENT: Negative.    Eyes: Negative.    Respiratory: Negative.    Cardiovascular: Negative.    Gastrointestinal: Positive for nausea.   Genitourinary: Negative.    Musculoskeletal: Negative.    Skin: Negative.    Neurological: Positive for tremors.   Psychiatric/Behavioral: Negative for suicidal ideas. The patient is nervous/anxious.    All other systems reviewed and are negative.      Past Medical History:   Diagnosis Date   • Alcohol abuse    • Alcoholism (HCC)    • Endocarditis    • Heart disease    • Hepatitis-C        No Known Allergies    Past Surgical History:   Procedure Laterality Date   • BACK SURGERY     • CARDIAC VALVE REPLACEMENT      two valves replaced    • KNEE SURGERY Right    • SHOULDER SURGERY Left        Family History   Problem Relation Age of Onset   • Depression Sister    • Suicide Attempts Sister        Social History     Socioeconomic History   • Marital status: Single   Tobacco Use   • Smoking status: Current Every Day Smoker     Packs/day: 1.00     Types: Cigarettes   • Smokeless tobacco: Never Used   Substance and Sexual Activity    • Alcohol use: Yes     Comment: 12-15 beers daily   • Drug use: Yes     Comment: etoh   • Sexual activity: Yes     Partners: Female           Objective   Physical Exam  Vitals and nursing note reviewed.   Constitutional:       General: He is not in acute distress.     Appearance: Normal appearance.   HENT:      Head: Normocephalic and atraumatic.      Right Ear: External ear normal.      Left Ear: External ear normal.   Eyes:      Conjunctiva/sclera: Conjunctivae normal.      Pupils: Pupils are equal, round, and reactive to light.   Cardiovascular:      Rate and Rhythm: Normal rate and regular rhythm.      Pulses: Normal pulses.      Heart sounds: Normal heart sounds.   Pulmonary:      Effort: Pulmonary effort is normal.      Breath sounds: Normal breath sounds.   Abdominal:      General: Bowel sounds are normal.      Palpations: Abdomen is soft.   Musculoskeletal:         General: Normal range of motion.      Cervical back: Normal range of motion and neck supple.   Skin:     General: Skin is warm and dry.      Capillary Refill: Capillary refill takes less than 2 seconds.   Neurological:      General: No focal deficit present.      Mental Status: He is alert and oriented to person, place, and time.   Psychiatric:         Mood and Affect: Mood is anxious.         Speech: Speech normal.         Behavior: Behavior normal. Behavior is cooperative.         Thought Content: Thought content does not include homicidal or suicidal ideation.         Procedures           ED Course  ED Course as of 03/01/22 1511   Tue Mar 01, 2022   1423 COVID19(!!): Detected []   1428 Patient tested positive for COVID-19 today.  This is a new diagnosis.  His withdrawal symptoms are minimal at this time.  His CIWA score was low.  He will be able to be discharged back to his rehab facility.  He will follow up outpatient and will return to the ED if anything changes. []      ED Course User Index  [] Sole Gonsalez PA                                                  MDM  Number of Diagnoses or Management Options     Amount and/or Complexity of Data Reviewed  Clinical lab tests: reviewed  Decide to obtain previous medical records or to obtain history from someone other than the patient: yes    Patient Progress  Patient progress: stable      Final diagnoses:   Alcohol abuse   COVID-19 virus infection       ED Disposition  ED Disposition     ED Disposition Condition Comment    Discharge Stable           Jyotsna Torre, APRN  [address] [place] [address]  [phone]    Schedule an appointment as soon as possible for a visit in 2 days  As needed         Medication List      Stop    chlordiazePOXIDE 25 MG capsule  Commonly known as: Sole Sullivan PA  03/01/22 1515

## 2022-03-01 NOTE — NURSING NOTE
Intake assessment completed. Patient presents to the ER and states that he went to [facility] last night around 930 pm for rehab for detox from alcohol. He states that he has been drinking most of his adult life. And that for the past four years has been drinking on average 8 tall cans of beer and sometimes a pint of vodka with that. Last drink was last night around 9 pm. Patient states that he had a rough morning and felt aweful, and finally went to sleep then he was woke up and told he had to come to the [facility] in [facility] to stay there. He states that he would like to detox medically then return to the rehab. He states that he is in acute liver failure and needs a transplant and [place] not do it until he gets off alcohol. CIWA at this time is 9. Pt denies SI HI or AVH.

## 2022-03-01 NOTE — NURSING NOTE
Spoke to Dr. Buckley via phone. Intake information and labs provided. Instructed that he cannot be admitted to the detox unit with new diagnosis of covid. Also withdrawals are minimal at this time. Instructed to DC and if he gets worse to have [facility] take him to the nearest hospital. [id number]

## 2022-03-05 ENCOUNTER — HOSPITAL ENCOUNTER (EMERGENCY)
Facility: HOSPITAL | Age: 42
Discharge: PSYCHIATRIC OR SUBSTANCE USE FACILITY | End: 2022-03-06
Attending: EMERGENCY MEDICINE | Admitting: EMERGENCY MEDICINE

## 2022-03-05 DIAGNOSIS — F10.10 ALCOHOL ABUSE: Primary | ICD-10-CM

## 2022-03-05 LAB
ALBUMIN SERPL-MCNC: 4.03 G/DL (ref 3.5–5.2)
ALBUMIN/GLOB SERPL: 0.9 G/DL
ALP SERPL-CCNC: 270 U/L (ref 39–117)
ALT SERPL W P-5'-P-CCNC: 26 U/L (ref 1–41)
AMPHET+METHAMPHET UR QL: NEGATIVE
AMPHETAMINES UR QL: NEGATIVE
ANION GAP SERPL CALCULATED.3IONS-SCNC: 11.3 MMOL/L (ref 5–15)
AST SERPL-CCNC: 68 U/L (ref 1–40)
BARBITURATES UR QL SCN: NEGATIVE
BASOPHILS # BLD AUTO: 0.1 10*3/MM3 (ref 0–0.2)
BASOPHILS NFR BLD AUTO: 1.5 % (ref 0–1.5)
BENZODIAZ UR QL SCN: POSITIVE
BILIRUB SERPL-MCNC: 1.4 MG/DL (ref 0–1.2)
BILIRUB UR QL STRIP: NEGATIVE
BUN SERPL-MCNC: 3 MG/DL (ref 6–20)
BUN/CREAT SERPL: 3.7 (ref 7–25)
BUPRENORPHINE SERPL-MCNC: NEGATIVE NG/ML
CALCIUM SPEC-SCNC: 8.8 MG/DL (ref 8.6–10.5)
CANNABINOIDS SERPL QL: POSITIVE
CHLORIDE SERPL-SCNC: 105 MMOL/L (ref 98–107)
CLARITY UR: CLEAR
CO2 SERPL-SCNC: 25.7 MMOL/L (ref 22–29)
COCAINE UR QL: NEGATIVE
COLOR UR: YELLOW
CREAT SERPL-MCNC: 0.81 MG/DL (ref 0.76–1.27)
DEPRECATED RDW RBC AUTO: 51.8 FL (ref 37–54)
EGFRCR SERPLBLD CKD-EPI 2021: 112.2 ML/MIN/1.73
EOSINOPHIL # BLD AUTO: 0.05 10*3/MM3 (ref 0–0.4)
EOSINOPHIL NFR BLD AUTO: 0.8 % (ref 0.3–6.2)
ERYTHROCYTE [DISTWIDTH] IN BLOOD BY AUTOMATED COUNT: 15.2 % (ref 12.3–15.4)
ETHANOL BLD-MCNC: 214 MG/DL (ref 0–10)
ETHANOL BLD-MCNC: 368 MG/DL (ref 0–10)
ETHANOL UR QL: 0.21 %
ETHANOL UR QL: 0.37 %
FLUAV RNA RESP QL NAA+PROBE: NOT DETECTED
FLUBV RNA RESP QL NAA+PROBE: NOT DETECTED
GLOBULIN UR ELPH-MCNC: 4.7 GM/DL
GLUCOSE SERPL-MCNC: 96 MG/DL (ref 65–99)
GLUCOSE UR STRIP-MCNC: NEGATIVE MG/DL
HCT VFR BLD AUTO: 43 % (ref 37.5–51)
HGB BLD-MCNC: 14.2 G/DL (ref 13–17.7)
HGB UR QL STRIP.AUTO: NEGATIVE
IMM GRANULOCYTES # BLD AUTO: 0.02 10*3/MM3 (ref 0–0.05)
IMM GRANULOCYTES NFR BLD AUTO: 0.3 % (ref 0–0.5)
KETONES UR QL STRIP: NEGATIVE
LEUKOCYTE ESTERASE UR QL STRIP.AUTO: NEGATIVE
LYMPHOCYTES # BLD AUTO: 2.51 10*3/MM3 (ref 0.7–3.1)
LYMPHOCYTES NFR BLD AUTO: 38 % (ref 19.6–45.3)
MAGNESIUM SERPL-MCNC: 2 MG/DL (ref 1.6–2.6)
MCH RBC QN AUTO: 31 PG (ref 26.6–33)
MCHC RBC AUTO-ENTMCNC: 33 G/DL (ref 31.5–35.7)
MCV RBC AUTO: 93.9 FL (ref 79–97)
METHADONE UR QL SCN: NEGATIVE
MONOCYTES # BLD AUTO: 0.91 10*3/MM3 (ref 0.1–0.9)
MONOCYTES NFR BLD AUTO: 13.8 % (ref 5–12)
NEUTROPHILS NFR BLD AUTO: 3.02 10*3/MM3 (ref 1.7–7)
NEUTROPHILS NFR BLD AUTO: 45.6 % (ref 42.7–76)
NITRITE UR QL STRIP: NEGATIVE
NRBC BLD AUTO-RTO: 0 /100 WBC (ref 0–0.2)
OPIATES UR QL: NEGATIVE
OXYCODONE UR QL SCN: NEGATIVE
PCP UR QL SCN: NEGATIVE
PH UR STRIP.AUTO: >=9 [PH] (ref 5–8)
PLATELET # BLD AUTO: 101 10*3/MM3 (ref 140–450)
PMV BLD AUTO: 11.9 FL (ref 6–12)
POTASSIUM SERPL-SCNC: 3.6 MMOL/L (ref 3.5–5.2)
PROPOXYPH UR QL: NEGATIVE
PROT SERPL-MCNC: 8.7 G/DL (ref 6–8.5)
PROT UR QL STRIP: NEGATIVE
RBC # BLD AUTO: 4.58 10*6/MM3 (ref 4.14–5.8)
SARS-COV-2 RNA RESP QL NAA+PROBE: DETECTED
SODIUM SERPL-SCNC: 142 MMOL/L (ref 136–145)
SP GR UR STRIP: 1.01 (ref 1–1.03)
TRICYCLICS UR QL SCN: NEGATIVE
UROBILINOGEN UR QL STRIP: ABNORMAL
WBC NRBC COR # BLD: 6.61 10*3/MM3 (ref 3.4–10.8)

## 2022-03-05 PROCEDURE — 83735 ASSAY OF MAGNESIUM: CPT | Performed by: PHYSICIAN ASSISTANT

## 2022-03-05 PROCEDURE — 96365 THER/PROPH/DIAG IV INF INIT: CPT

## 2022-03-05 PROCEDURE — 36415 COLL VENOUS BLD VENIPUNCTURE: CPT

## 2022-03-05 PROCEDURE — 96366 THER/PROPH/DIAG IV INF ADDON: CPT

## 2022-03-05 PROCEDURE — 82077 ASSAY SPEC XCP UR&BREATH IA: CPT | Performed by: NURSE PRACTITIONER

## 2022-03-05 PROCEDURE — C9803 HOPD COVID-19 SPEC COLLECT: HCPCS

## 2022-03-05 PROCEDURE — 25010000002 THIAMINE PER 100 MG: Performed by: PHYSICIAN ASSISTANT

## 2022-03-05 PROCEDURE — 25010000002 LORAZEPAM PER 2 MG: Performed by: EMERGENCY MEDICINE

## 2022-03-05 PROCEDURE — 80306 DRUG TEST PRSMV INSTRMNT: CPT | Performed by: PHYSICIAN ASSISTANT

## 2022-03-05 PROCEDURE — 82077 ASSAY SPEC XCP UR&BREATH IA: CPT | Performed by: PHYSICIAN ASSISTANT

## 2022-03-05 PROCEDURE — 85025 COMPLETE CBC W/AUTO DIFF WBC: CPT | Performed by: PHYSICIAN ASSISTANT

## 2022-03-05 PROCEDURE — 96375 TX/PRO/DX INJ NEW DRUG ADDON: CPT

## 2022-03-05 PROCEDURE — 87636 SARSCOV2 & INF A&B AMP PRB: CPT | Performed by: PHYSICIAN ASSISTANT

## 2022-03-05 PROCEDURE — 81003 URINALYSIS AUTO W/O SCOPE: CPT | Performed by: PHYSICIAN ASSISTANT

## 2022-03-05 PROCEDURE — 80053 COMPREHEN METABOLIC PANEL: CPT | Performed by: PHYSICIAN ASSISTANT

## 2022-03-05 RX ORDER — SODIUM CHLORIDE 0.9 % (FLUSH) 0.9 %
10 SYRINGE (ML) INJECTION AS NEEDED
Status: DISCONTINUED | OUTPATIENT
Start: 2022-03-05 | End: 2022-03-06 | Stop reason: HOSPADM

## 2022-03-05 RX ORDER — LORAZEPAM 2 MG/ML
1 INJECTION INTRAMUSCULAR ONCE
Status: COMPLETED | OUTPATIENT
Start: 2022-03-05 | End: 2022-03-05

## 2022-03-05 RX ORDER — NICOTINE 21 MG/24HR
1 PATCH, TRANSDERMAL 24 HOURS TRANSDERMAL
Status: DISCONTINUED | OUTPATIENT
Start: 2022-03-05 | End: 2022-03-06 | Stop reason: HOSPADM

## 2022-03-05 RX ADMIN — THIAMINE HYDROCHLORIDE 1000 ML/HR: 100 INJECTION, SOLUTION INTRAMUSCULAR; INTRAVENOUS at 18:12

## 2022-03-05 RX ADMIN — LORAZEPAM 1 MG: 2 INJECTION, SOLUTION INTRAMUSCULAR; INTRAVENOUS at 18:33

## 2022-03-05 RX ADMIN — NICOTINE 1 PATCH: 21 PATCH, EXTENDED RELEASE TRANSDERMAL at 18:33

## 2022-03-05 NOTE — ED PROVIDER NOTES
Subjective   40-year-old white male presents secondary to alcohol abuse.  Patient states that he is been drinking heavily for quite some time.  He denies any suicidal homicidal ideation.  Voices no other complaints this time.          Review of Systems   Constitutional: Negative.  Negative for fever.   HENT: Negative.    Respiratory: Negative.    Cardiovascular: Negative.  Negative for chest pain.   Gastrointestinal: Negative.  Negative for abdominal pain.   Endocrine: Negative.    Genitourinary: Negative.  Negative for dysuria.   Skin: Negative.    Neurological: Negative.    Psychiatric/Behavioral: Positive for suicidal ideas.   All other systems reviewed and are negative.      Past Medical History:   Diagnosis Date   • Alcohol abuse    • Alcoholism (HCC)    • Endocarditis    • Heart disease    • Hepatitis-C        No Known Allergies    Past Surgical History:   Procedure Laterality Date   • BACK SURGERY     • CARDIAC VALVE REPLACEMENT      two valves replaced    • KNEE SURGERY Right    • SHOULDER SURGERY Left        Family History   Problem Relation Age of Onset   • Depression Sister    • Suicide Attempts Sister        Social History     Socioeconomic History   • Marital status: Single   Tobacco Use   • Smoking status: Current Every Day Smoker     Packs/day: 1.00     Types: Cigarettes   • Smokeless tobacco: Never Used   Substance and Sexual Activity   • Alcohol use: Yes     Comment: 12-15 beers daily   • Drug use: Yes     Comment: etoh   • Sexual activity: Yes     Partners: Female           Objective   Physical Exam  Vitals and nursing note reviewed.   Constitutional:       General: He is not in acute distress.     Appearance: He is well-developed. He is not diaphoretic.      Comments: Appears intoxicated.  Smells of alcohol.   HENT:      Head: Normocephalic and atraumatic.      Right Ear: External ear normal.      Left Ear: External ear normal.      Nose: Nose normal.   Eyes:      Conjunctiva/sclera: Conjunctivae  normal.      Pupils: Pupils are equal, round, and reactive to light.   Neck:      Vascular: No JVD.      Trachea: No tracheal deviation.   Cardiovascular:      Rate and Rhythm: Normal rate and regular rhythm.      Heart sounds: Normal heart sounds. No murmur heard.  Pulmonary:      Effort: Pulmonary effort is normal. No respiratory distress.      Breath sounds: Normal breath sounds. No wheezing.   Abdominal:      General: Bowel sounds are normal.      Palpations: Abdomen is soft.      Tenderness: There is no abdominal tenderness.   Musculoskeletal:         General: No deformity. Normal range of motion.      Cervical back: Normal range of motion and neck supple.   Skin:     General: Skin is warm and dry.      Coloration: Skin is not pale.      Findings: No erythema or rash.   Neurological:      Mental Status: He is alert and oriented to person, place, and time.      Cranial Nerves: No cranial nerve deficit.   Psychiatric:         Behavior: Behavior normal.         Thought Content: Thought content normal. Thought content does not include homicidal or suicidal ideation.         Procedures         Results for orders placed or performed during the hospital encounter of 03/05/22   COVID-19 and FLU A/B PCR - Swab, Nasopharynx    Specimen: Nasopharynx; Swab   Result Value Ref Range    COVID19 Detected (C) Not Detected - Ref. Range    Influenza A PCR Not Detected Not Detected    Influenza B PCR Not Detected Not Detected   Comprehensive Metabolic Panel    Specimen: Arm, Right; Blood   Result Value Ref Range    Glucose 96 65 - 99 mg/dL    BUN 3 (L) 6 - 20 mg/dL    Creatinine 0.81 0.76 - 1.27 mg/dL    Sodium 142 136 - 145 mmol/L    Potassium 3.6 3.5 - 5.2 mmol/L    Chloride 105 98 - 107 mmol/L    CO2 25.7 22.0 - 29.0 mmol/L    Calcium 8.8 8.6 - 10.5 mg/dL    Total Protein 8.7 (H) 6.0 - 8.5 g/dL    Albumin 4.03 3.50 - 5.20 g/dL    ALT (SGPT) 26 1 - 41 U/L    AST (SGOT) 68 (H) 1 - 40 U/L    Alkaline Phosphatase 270 (H) 39 - 117  U/L    Total Bilirubin 1.4 (H) 0.0 - 1.2 mg/dL    Globulin 4.7 gm/dL    A/G Ratio 0.9 g/dL    BUN/Creatinine Ratio 3.7 (L) 7.0 - 25.0    Anion Gap 11.3 5.0 - 15.0 mmol/L    eGFR 112.2 >60.0 mL/min/1.73   Urinalysis With Microscopic If Indicated (No Culture) - Urine, Clean Catch    Specimen: Urine, Clean Catch   Result Value Ref Range    Color, UA Yellow Yellow, Straw    Appearance, UA Clear Clear    pH, UA >=9.0 (H) 5.0 - 8.0    Specific Gravity, UA 1.012 1.005 - 1.030    Glucose, UA Negative Negative    Ketones, UA Negative Negative    Bilirubin, UA Negative Negative    Blood, UA Negative Negative    Protein, UA Negative Negative    Leuk Esterase, UA Negative Negative    Nitrite, UA Negative Negative    Urobilinogen, UA 4.0 E.U./dL (A) 0.2 - 1.0 E.U./dL   Ethanol    Specimen: Arm, Right; Blood   Result Value Ref Range    Ethanol 368 (H) 0 - 10 mg/dL    Ethanol % 0.368 %   Urine Drug Screen - Urine, Clean Catch    Specimen: Urine, Clean Catch   Result Value Ref Range    THC, Screen, Urine Positive (A) Negative    Phencyclidine (PCP), Urine Negative Negative    Cocaine Screen, Urine Negative Negative    Methamphetamine, Ur Negative Negative    Opiate Screen Negative Negative    Amphetamine Screen, Urine Negative Negative    Benzodiazepine Screen, Urine Positive (A) Negative    Tricyclic Antidepressants Screen Negative Negative    Methadone Screen, Urine Negative Negative    Barbiturates Screen, Urine Negative Negative    Oxycodone Screen, Urine Negative Negative    Propoxyphene Screen Negative Negative    Buprenorphine, Screen, Urine Negative Negative   Magnesium    Specimen: Arm, Right; Blood   Result Value Ref Range    Magnesium 2.0 1.6 - 2.6 mg/dL   CBC Auto Differential    Specimen: Arm, Right; Blood   Result Value Ref Range    WBC 6.61 3.40 - 10.80 10*3/mm3    RBC 4.58 4.14 - 5.80 10*6/mm3    Hemoglobin 14.2 13.0 - 17.7 g/dL    Hematocrit 43.0 37.5 - 51.0 %    MCV 93.9 79.0 - 97.0 fL    MCH 31.0 26.6 - 33.0 pg     MCHC 33.0 31.5 - 35.7 g/dL    RDW 15.2 12.3 - 15.4 %    RDW-SD 51.8 37.0 - 54.0 fl    MPV 11.9 6.0 - 12.0 fL    Platelets 101 (L) 140 - 450 10*3/mm3    Neutrophil % 45.6 42.7 - 76.0 %    Lymphocyte % 38.0 19.6 - 45.3 %    Monocyte % 13.8 (H) 5.0 - 12.0 %    Eosinophil % 0.8 0.3 - 6.2 %    Basophil % 1.5 0.0 - 1.5 %    Immature Grans % 0.3 0.0 - 0.5 %    Neutrophils, Absolute 3.02 1.70 - 7.00 10*3/mm3    Lymphocytes, Absolute 2.51 0.70 - 3.10 10*3/mm3    Monocytes, Absolute 0.91 (H) 0.10 - 0.90 10*3/mm3    Eosinophils, Absolute 0.05 0.00 - 0.40 10*3/mm3    Basophils, Absolute 0.10 0.00 - 0.20 10*3/mm3    Immature Grans, Absolute 0.02 0.00 - 0.05 10*3/mm3    nRBC 0.0 0.0 - 0.2 /100 WBC   Ethanol    Specimen: Hand, Left; Blood   Result Value Ref Range    Ethanol 214 (H) 0 - 10 mg/dL    Ethanol % 0.214 %   Ethanol    Specimen: Hand, Left; Blood   Result Value Ref Range    Ethanol 106 (H) 0 - 10 mg/dL    Ethanol % 0.106 %   Ethanol    Specimen: Blood   Result Value Ref Range    Ethanol 81 (H) 0 - 10 mg/dL    Ethanol % 0.081 %     ED Course  ED Course as of 03/06/22 0833   Sat Mar 05, 2022   1956 Signed out to Rosina Loaiza [JI]   Sun Mar 06, 2022   0040 Patient requesting more ativan. He is awake and alert. States he feels a little shaky. Overall he is stable. Vitals stable. NADN. Waiting for ETOH level to trend down to 100 for psych evaluation.  [MB]   0655 Patient cleared for intake. [MB]   0833 Patient did not meet criteria due to being Covid positive. He will return back to ED. [MB]      ED Course User Index  [JI] Shelton Perez PA  [MB] Rosina Loaiza, GURWINDER                                                 [facility]    Final diagnoses:   Alcohol abuse       ED Disposition  ED Disposition     ED Disposition   Discharge    Condition   --    Comment   --             Jyotsna Torre, APRN  [address] [place] [id number] [phone]    Call in 2 days           Medication List      No changes were made to your prescriptions  during this visit.          Rosina Loaiza, APRN  03/06/22 0829

## 2022-03-05 NOTE — ED NOTES
Pt states he wants to go to rehab in [place] but cannot go until he medically detoxes. Pt states he drinks daily and drank prior to arrival. Pt has no concerns or complaints at this time.     Veronika Lauren RN  03/05/22 8154

## 2022-03-06 VITALS
RESPIRATION RATE: 16 BRPM | HEIGHT: 72 IN | TEMPERATURE: 98 F | BODY MASS INDEX: 25.73 KG/M2 | HEART RATE: 73 BPM | WEIGHT: 190 LBS | SYSTOLIC BLOOD PRESSURE: 129 MMHG | DIASTOLIC BLOOD PRESSURE: 77 MMHG | OXYGEN SATURATION: 94 %

## 2022-03-06 LAB
ETHANOL BLD-MCNC: 106 MG/DL (ref 0–10)
ETHANOL BLD-MCNC: 81 MG/DL (ref 0–10)
ETHANOL UR QL: 0.08 %
ETHANOL UR QL: 0.11 %

## 2022-03-06 PROCEDURE — 99284 EMERGENCY DEPT VISIT MOD MDM: CPT

## 2022-03-06 PROCEDURE — 82077 ASSAY SPEC XCP UR&BREATH IA: CPT | Performed by: NURSE PRACTITIONER

## 2022-03-06 PROCEDURE — 25010000002 LORAZEPAM PER 2 MG: Performed by: NURSE PRACTITIONER

## 2022-03-06 PROCEDURE — 96376 TX/PRO/DX INJ SAME DRUG ADON: CPT

## 2022-03-06 PROCEDURE — 82077 ASSAY SPEC XCP UR&BREATH IA: CPT | Performed by: EMERGENCY MEDICINE

## 2022-03-06 RX ORDER — LORAZEPAM 2 MG/ML
1 INJECTION INTRAMUSCULAR ONCE
Status: COMPLETED | OUTPATIENT
Start: 2022-03-06 | End: 2022-03-06

## 2022-03-06 RX ADMIN — LORAZEPAM 1 MG: 2 INJECTION, SOLUTION INTRAMUSCULAR; INTRAVENOUS at 00:50

## 2022-03-06 NOTE — NURSING NOTE
Called and provided intake information including all abnormal  labs to Dr Buckley .discharge orders received.RBVOx2. Patient and ED provider aware.

## 2022-03-06 NOTE — NURSING NOTE
Full intake assessment completed Patient presented to ED via family his desire is to detox from alcohol in which he drinks 14-18 beers daily and has for the last 5 years. He said his goal is to detox here and then [facility]. Patient presented here on 03/01/22 and tested positive for Covid and is currently still in the window of Covid he currently test positive for Covid today he denies any symptoms. Patient reported poor sleep stating he can't go to sleep without consuming alcohol till he passes out. Patient denies SI,HI,drugs, and AVH CIWA (4) denies seizures in the past. Rated anxiety 5/10 denies depression. Poor appetite when drinking alcohol.

## 2022-03-11 ENCOUNTER — HOSPITAL ENCOUNTER (EMERGENCY)
Facility: HOSPITAL | Age: 42
Discharge: PSYCHIATRIC OR SUBSTANCE USE FACILITY | End: 2022-03-12
Attending: FAMILY MEDICINE

## 2022-03-11 DIAGNOSIS — F19.10 POLYSUBSTANCE ABUSE: Primary | ICD-10-CM

## 2022-03-11 LAB
ALBUMIN SERPL-MCNC: 3.68 G/DL (ref 3.5–5.2)
ALBUMIN/GLOB SERPL: 0.9 G/DL
ALP SERPL-CCNC: 260 U/L (ref 39–117)
ALT SERPL W P-5'-P-CCNC: 32 U/L (ref 1–41)
AMPHET+METHAMPHET UR QL: POSITIVE
AMPHETAMINES UR QL: POSITIVE
ANION GAP SERPL CALCULATED.3IONS-SCNC: 12.6 MMOL/L (ref 5–15)
APAP SERPL-MCNC: <5 MCG/ML (ref 0–30)
AST SERPL-CCNC: 80 U/L (ref 1–40)
BARBITURATES UR QL SCN: NEGATIVE
BASOPHILS # BLD AUTO: 0.09 10*3/MM3 (ref 0–0.2)
BASOPHILS NFR BLD AUTO: 1.2 % (ref 0–1.5)
BENZODIAZ UR QL SCN: POSITIVE
BILIRUB SERPL-MCNC: 2.1 MG/DL (ref 0–1.2)
BUN SERPL-MCNC: 4 MG/DL (ref 6–20)
BUN/CREAT SERPL: 4.9 (ref 7–25)
BUPRENORPHINE SERPL-MCNC: NEGATIVE NG/ML
CALCIUM SPEC-SCNC: 8.5 MG/DL (ref 8.6–10.5)
CANNABINOIDS SERPL QL: POSITIVE
CHLORIDE SERPL-SCNC: 99 MMOL/L (ref 98–107)
CO2 SERPL-SCNC: 25.4 MMOL/L (ref 22–29)
COCAINE UR QL: NEGATIVE
CREAT SERPL-MCNC: 0.81 MG/DL (ref 0.76–1.27)
DEPRECATED RDW RBC AUTO: 54.2 FL (ref 37–54)
EGFRCR SERPLBLD CKD-EPI 2021: 112.2 ML/MIN/1.73
EOSINOPHIL # BLD AUTO: 0.03 10*3/MM3 (ref 0–0.4)
EOSINOPHIL NFR BLD AUTO: 0.4 % (ref 0.3–6.2)
ERYTHROCYTE [DISTWIDTH] IN BLOOD BY AUTOMATED COUNT: 16.1 % (ref 12.3–15.4)
ETHANOL BLD-MCNC: 176 MG/DL (ref 0–10)
ETHANOL UR QL: 0.18 %
FLUAV RNA RESP QL NAA+PROBE: NOT DETECTED
FLUBV RNA RESP QL NAA+PROBE: NOT DETECTED
GLOBULIN UR ELPH-MCNC: 4.3 GM/DL
GLUCOSE SERPL-MCNC: 99 MG/DL (ref 65–99)
HCT VFR BLD AUTO: 39.9 % (ref 37.5–51)
HGB BLD-MCNC: 13.5 G/DL (ref 13–17.7)
HOLD SPECIMEN: NORMAL
HOLD SPECIMEN: NORMAL
IMM GRANULOCYTES # BLD AUTO: 0.02 10*3/MM3 (ref 0–0.05)
IMM GRANULOCYTES NFR BLD AUTO: 0.3 % (ref 0–0.5)
LYMPHOCYTES # BLD AUTO: 2.27 10*3/MM3 (ref 0.7–3.1)
LYMPHOCYTES NFR BLD AUTO: 29 % (ref 19.6–45.3)
MAGNESIUM SERPL-MCNC: 1.3 MG/DL (ref 1.6–2.6)
MCH RBC QN AUTO: 31.2 PG (ref 26.6–33)
MCHC RBC AUTO-ENTMCNC: 33.8 G/DL (ref 31.5–35.7)
MCV RBC AUTO: 92.1 FL (ref 79–97)
METHADONE UR QL SCN: NEGATIVE
MONOCYTES # BLD AUTO: 1.04 10*3/MM3 (ref 0.1–0.9)
MONOCYTES NFR BLD AUTO: 13.3 % (ref 5–12)
NEUTROPHILS NFR BLD AUTO: 4.37 10*3/MM3 (ref 1.7–7)
NEUTROPHILS NFR BLD AUTO: 55.8 % (ref 42.7–76)
NRBC BLD AUTO-RTO: 0 /100 WBC (ref 0–0.2)
OPIATES UR QL: NEGATIVE
OXYCODONE UR QL SCN: NEGATIVE
PCP UR QL SCN: NEGATIVE
PLATELET # BLD AUTO: 89 10*3/MM3 (ref 140–450)
PMV BLD AUTO: 12 FL (ref 6–12)
POTASSIUM SERPL-SCNC: 3.2 MMOL/L (ref 3.5–5.2)
PROPOXYPH UR QL: NEGATIVE
PROT SERPL-MCNC: 8 G/DL (ref 6–8.5)
RBC # BLD AUTO: 4.33 10*6/MM3 (ref 4.14–5.8)
SALICYLATES SERPL-MCNC: <0.3 MG/DL
SARS-COV-2 RNA RESP QL NAA+PROBE: DETECTED
SODIUM SERPL-SCNC: 137 MMOL/L (ref 136–145)
TRICYCLICS UR QL SCN: NEGATIVE
WBC NRBC COR # BLD: 7.82 10*3/MM3 (ref 3.4–10.8)
WHOLE BLOOD HOLD SPECIMEN: NORMAL
WHOLE BLOOD HOLD SPECIMEN: NORMAL

## 2022-03-11 PROCEDURE — 83735 ASSAY OF MAGNESIUM: CPT | Performed by: NURSE PRACTITIONER

## 2022-03-11 PROCEDURE — 93005 ELECTROCARDIOGRAM TRACING: CPT | Performed by: FAMILY MEDICINE

## 2022-03-11 PROCEDURE — 99285 EMERGENCY DEPT VISIT HI MDM: CPT

## 2022-03-11 PROCEDURE — 82077 ASSAY SPEC XCP UR&BREATH IA: CPT | Performed by: NURSE PRACTITIONER

## 2022-03-11 PROCEDURE — 80053 COMPREHEN METABOLIC PANEL: CPT | Performed by: NURSE PRACTITIONER

## 2022-03-11 PROCEDURE — 80306 DRUG TEST PRSMV INSTRMNT: CPT | Performed by: NURSE PRACTITIONER

## 2022-03-11 PROCEDURE — 80179 DRUG ASSAY SALICYLATE: CPT | Performed by: NURSE PRACTITIONER

## 2022-03-11 PROCEDURE — 85025 COMPLETE CBC W/AUTO DIFF WBC: CPT | Performed by: NURSE PRACTITIONER

## 2022-03-11 PROCEDURE — 80143 DRUG ASSAY ACETAMINOPHEN: CPT | Performed by: NURSE PRACTITIONER

## 2022-03-11 PROCEDURE — 25010000002 MAGNESIUM SULFATE IN D5W 1G/100ML (PREMIX) 1-5 GM/100ML-% SOLUTION: Performed by: FAMILY MEDICINE

## 2022-03-11 PROCEDURE — 87636 SARSCOV2 & INF A&B AMP PRB: CPT | Performed by: NURSE PRACTITIONER

## 2022-03-11 PROCEDURE — 25010000002 LORAZEPAM PER 2 MG: Performed by: FAMILY MEDICINE

## 2022-03-11 PROCEDURE — 25010000002 THIAMINE PER 100 MG: Performed by: FAMILY MEDICINE

## 2022-03-11 RX ORDER — LORAZEPAM 2 MG/ML
0.5 INJECTION INTRAMUSCULAR ONCE
Status: COMPLETED | OUTPATIENT
Start: 2022-03-11 | End: 2022-03-11

## 2022-03-11 RX ORDER — MAGNESIUM SULFATE 1 G/100ML
1 INJECTION INTRAVENOUS ONCE
Status: COMPLETED | OUTPATIENT
Start: 2022-03-11 | End: 2022-03-12

## 2022-03-11 RX ADMIN — LORAZEPAM 0.5 MG: 2 INJECTION INTRAMUSCULAR at 22:40

## 2022-03-11 RX ADMIN — MAGNESIUM SULFATE HEPTAHYDRATE 1 G: 1 INJECTION, SOLUTION INTRAVENOUS at 22:45

## 2022-03-11 RX ADMIN — THIAMINE HYDROCHLORIDE 1000 ML/HR: 100 INJECTION, SOLUTION INTRAMUSCULAR; INTRAVENOUS at 22:44

## 2022-03-12 ENCOUNTER — HOSPITAL ENCOUNTER (INPATIENT)
Facility: HOSPITAL | Age: 42
LOS: 3 days | Discharge: HOME | End: 2022-03-15
Attending: PSYCHIATRY & NEUROLOGY | Admitting: PSYCHIATRY & NEUROLOGY

## 2022-03-12 VITALS
WEIGHT: 195 LBS | TEMPERATURE: 98 F | HEIGHT: 72 IN | BODY MASS INDEX: 26.41 KG/M2 | DIASTOLIC BLOOD PRESSURE: 81 MMHG | HEART RATE: 101 BPM | OXYGEN SATURATION: 95 % | SYSTOLIC BLOOD PRESSURE: 129 MMHG | RESPIRATION RATE: 18 BRPM

## 2022-03-12 PROBLEM — F19.20 CHEMICAL DEPENDENCY (HCC): Status: ACTIVE | Noted: 2022-03-12

## 2022-03-12 LAB
ETHANOL BLD-MCNC: 101 MG/DL (ref 0–10)
ETHANOL UR QL: 0.1 %
MAGNESIUM SERPL-MCNC: 1.3 MG/DL (ref 1.6–2.6)
POTASSIUM SERPL-SCNC: 3.8 MMOL/L (ref 3.5–5.2)
QT INTERVAL: 402 MS
QTC INTERVAL: 472 MS

## 2022-03-12 PROCEDURE — 99222 1ST HOSP IP/OBS MODERATE 55: CPT | Performed by: PSYCHIATRY & NEUROLOGY

## 2022-03-12 PROCEDURE — 83735 ASSAY OF MAGNESIUM: CPT | Performed by: PSYCHIATRY & NEUROLOGY

## 2022-03-12 PROCEDURE — 63710000001 ONDANSETRON PER 8 MG: Performed by: PSYCHIATRY & NEUROLOGY

## 2022-03-12 PROCEDURE — 84132 ASSAY OF SERUM POTASSIUM: CPT | Performed by: PSYCHIATRY & NEUROLOGY

## 2022-03-12 PROCEDURE — 36415 COLL VENOUS BLD VENIPUNCTURE: CPT

## 2022-03-12 PROCEDURE — 82077 ASSAY SPEC XCP UR&BREATH IA: CPT | Performed by: FAMILY MEDICINE

## 2022-03-12 RX ORDER — IBUPROFEN 400 MG/1
400 TABLET ORAL EVERY 6 HOURS PRN
Status: DISCONTINUED | OUTPATIENT
Start: 2022-03-12 | End: 2022-03-15 | Stop reason: HOSPADM

## 2022-03-12 RX ORDER — FAMOTIDINE 20 MG/1
20 TABLET, FILM COATED ORAL 2 TIMES DAILY PRN
Status: DISCONTINUED | OUTPATIENT
Start: 2022-03-12 | End: 2022-03-15 | Stop reason: HOSPADM

## 2022-03-12 RX ORDER — LORAZEPAM 2 MG/1
2 TABLET ORAL
Status: DISPENSED | OUTPATIENT
Start: 2022-03-12 | End: 2022-03-13

## 2022-03-12 RX ORDER — TRAZODONE HYDROCHLORIDE 50 MG/1
50 TABLET ORAL NIGHTLY PRN
Status: DISCONTINUED | OUTPATIENT
Start: 2022-03-12 | End: 2022-03-15 | Stop reason: HOSPADM

## 2022-03-12 RX ORDER — MULTIVITAMIN WITH IRON
2 TABLET ORAL DAILY
Status: DISCONTINUED | OUTPATIENT
Start: 2022-03-12 | End: 2022-03-15 | Stop reason: HOSPADM

## 2022-03-12 RX ORDER — LORAZEPAM 2 MG/1
2 TABLET ORAL EVERY 4 HOURS PRN
Status: ACTIVE | OUTPATIENT
Start: 2022-03-13 | End: 2022-03-14

## 2022-03-12 RX ORDER — LORAZEPAM 1 MG/1
1 TABLET ORAL
Status: DISCONTINUED | OUTPATIENT
Start: 2022-03-15 | End: 2022-03-15 | Stop reason: HOSPADM

## 2022-03-12 RX ORDER — POTASSIUM CHLORIDE 20 MEQ/1
60 TABLET, EXTENDED RELEASE ORAL ONCE
Status: COMPLETED | OUTPATIENT
Start: 2022-03-12 | End: 2022-03-12

## 2022-03-12 RX ORDER — LORAZEPAM 0.5 MG/1
0.5 TABLET ORAL EVERY 4 HOURS PRN
Status: DISCONTINUED | OUTPATIENT
Start: 2022-03-16 | End: 2022-03-15 | Stop reason: HOSPADM

## 2022-03-12 RX ORDER — ALUMINA, MAGNESIA, AND SIMETHICONE 2400; 2400; 240 MG/30ML; MG/30ML; MG/30ML
15 SUSPENSION ORAL EVERY 6 HOURS PRN
Status: DISCONTINUED | OUTPATIENT
Start: 2022-03-12 | End: 2022-03-15 | Stop reason: HOSPADM

## 2022-03-12 RX ORDER — BENZTROPINE MESYLATE 1 MG/ML
1 INJECTION INTRAMUSCULAR; INTRAVENOUS ONCE AS NEEDED
Status: DISCONTINUED | OUTPATIENT
Start: 2022-03-12 | End: 2022-03-15 | Stop reason: HOSPADM

## 2022-03-12 RX ORDER — BENZONATATE 100 MG/1
100 CAPSULE ORAL 3 TIMES DAILY PRN
Status: DISCONTINUED | OUTPATIENT
Start: 2022-03-12 | End: 2022-03-15 | Stop reason: HOSPADM

## 2022-03-12 RX ORDER — BENZTROPINE MESYLATE 1 MG/1
2 TABLET ORAL ONCE AS NEEDED
Status: DISCONTINUED | OUTPATIENT
Start: 2022-03-12 | End: 2022-03-15 | Stop reason: HOSPADM

## 2022-03-12 RX ORDER — LORAZEPAM 0.5 MG/1
0.5 TABLET ORAL
Status: DISCONTINUED | OUTPATIENT
Start: 2022-03-16 | End: 2022-03-15 | Stop reason: HOSPADM

## 2022-03-12 RX ORDER — MULTIPLE VITAMINS W/ MINERALS TAB 9MG-400MCG
1 TAB ORAL DAILY
Status: DISCONTINUED | OUTPATIENT
Start: 2022-03-12 | End: 2022-03-15 | Stop reason: HOSPADM

## 2022-03-12 RX ORDER — ECHINACEA PURPUREA EXTRACT 125 MG
2 TABLET ORAL AS NEEDED
Status: DISCONTINUED | OUTPATIENT
Start: 2022-03-12 | End: 2022-03-15 | Stop reason: HOSPADM

## 2022-03-12 RX ORDER — ONDANSETRON 4 MG/1
4 TABLET, FILM COATED ORAL EVERY 6 HOURS PRN
Status: DISCONTINUED | OUTPATIENT
Start: 2022-03-12 | End: 2022-03-15 | Stop reason: HOSPADM

## 2022-03-12 RX ORDER — NICOTINE 21 MG/24HR
1 PATCH, TRANSDERMAL 24 HOURS TRANSDERMAL
Status: DISCONTINUED | OUTPATIENT
Start: 2022-03-12 | End: 2022-03-15 | Stop reason: HOSPADM

## 2022-03-12 RX ORDER — LOPERAMIDE HYDROCHLORIDE 2 MG/1
2 CAPSULE ORAL
Status: DISCONTINUED | OUTPATIENT
Start: 2022-03-12 | End: 2022-03-15 | Stop reason: HOSPADM

## 2022-03-12 RX ORDER — HYDROXYZINE 50 MG/1
50 TABLET, FILM COATED ORAL EVERY 6 HOURS PRN
Status: DISCONTINUED | OUTPATIENT
Start: 2022-03-12 | End: 2022-03-15 | Stop reason: HOSPADM

## 2022-03-12 RX ORDER — LORAZEPAM 1 MG/1
1 TABLET ORAL EVERY 4 HOURS PRN
Status: DISCONTINUED | OUTPATIENT
Start: 2022-03-15 | End: 2022-03-15 | Stop reason: HOSPADM

## 2022-03-12 RX ORDER — LORAZEPAM 2 MG/1
2 TABLET ORAL
Status: COMPLETED | OUTPATIENT
Start: 2022-03-13 | End: 2022-03-13

## 2022-03-12 RX ADMIN — HYDROXYZINE HYDROCHLORIDE 50 MG: 50 TABLET ORAL at 04:50

## 2022-03-12 RX ADMIN — LORAZEPAM 2 MG: 2 TABLET ORAL at 21:28

## 2022-03-12 RX ADMIN — TRAZODONE HYDROCHLORIDE 50 MG: 50 TABLET ORAL at 21:28

## 2022-03-12 RX ADMIN — Medication 1 TABLET: at 08:13

## 2022-03-12 RX ADMIN — ONDANSETRON HYDROCHLORIDE 4 MG: 4 TABLET, FILM COATED ORAL at 21:28

## 2022-03-12 RX ADMIN — MAGNESIUM GLUCONATE 500 MG ORAL TABLET 400 MG: 500 TABLET ORAL at 21:29

## 2022-03-12 RX ADMIN — LORAZEPAM 2 MG: 2 TABLET ORAL at 04:51

## 2022-03-12 RX ADMIN — Medication 100 MG: at 08:13

## 2022-03-12 RX ADMIN — POTASSIUM CHLORIDE 60 MEQ: 20 TABLET, EXTENDED RELEASE ORAL at 04:16

## 2022-03-12 RX ADMIN — TRAZODONE HYDROCHLORIDE 50 MG: 50 TABLET ORAL at 04:50

## 2022-03-12 RX ADMIN — HYDROXYZINE HYDROCHLORIDE 50 MG: 50 TABLET ORAL at 21:28

## 2022-03-12 RX ADMIN — Medication 2 TABLET: at 08:12

## 2022-03-12 RX ADMIN — IBUPROFEN 400 MG: 400 TABLET, FILM COATED ORAL at 21:28

## 2022-03-12 NOTE — NURSING NOTE
Called and spoke with house supervisor Jamin concerning pt positive less than 14 day for guidance on quarantine and admission of pt to detox unit; Jamin stated it is 14 days no symptoms; called and spoke with admin on call Linh she stated to get direction from MD on whether the on call would admit patient; called and advised Madelyn intake nurse of this.

## 2022-03-12 NOTE — PLAN OF CARE
Goal Outcome Evaluation:  Plan of Care Reviewed With: patient  Patient Agreement with Plan of Care: agrees     Progress: improving  Outcome Evaluation: Patient has spent most of the day in the bed, resting. Rates anxiety 9/10. Depression 4/10. Cravings 10/10. Wd's tremors, stomach cramps, nausea, and diarrhea. Denies SI, HI, or [place]. No other issues noted at this time. Will continue to monitor.

## 2022-03-12 NOTE — NURSING NOTE
Spoke with Dr. Staples presenting pt clinicals. MD advised pt does not meet criteria for admission. Careful discussion with patient about discharge. No objective or reported signs of SI or acute distress, or self harm. Safety plan discussed, patient contracted. Crisis number provided. Advised if change of condition or worsening of symptoms return to ED and/or seek outpt services. Chemical Dependency information provided to pt at this time. Pt may return for reevaluation 14 days after first positive Covid result.

## 2022-03-12 NOTE — NURSING NOTE
Pt has been asymptomatic x 12 days and per [facility] asymptomatic patients can be admitted after 10 day quarantine. Orders obtained for admission from Dr. Staples at this time for SP4 routine orders with Ativan detox RBVOx2. Patient and ED provider made aware of plan of care. Safety precautions maintained.

## 2022-03-12 NOTE — PLAN OF CARE
Problem: Adult Behavioral Health Plan of Care  Goal: Plan of Care Review  Outcome: Ongoing, Progressing  Flowsheets (Taken 3/12/2022 1313)  Consent Given to Review Plan with: Spouse Minoo  Progress: no change  Plan of Care Reviewed With:   patient   significant other  Patient Agreement with Plan of Care: agrees  Outcome Evaluation: New admit. Completed social history and integrated summary  Goal: Patient-Specific Goal (Individualization)  Outcome: Ongoing, Progressing  Flowsheets  Taken 3/12/2022 1313 by Lisbet Urena  Patient-Specific Goals (Include Timeframe): Patient will deny SI/HI prior to discharge. Patient will identify 1 healthy coping skill for relapse prevention prior to discharge. Patient will consent to appropriate aftercare plan prior to discharge.  Individualized Care Needs: Therapist will offer 1-4 individual sessions, family education, aftercare planning  Taken 3/12/2022 1305 by Lisbet Urena  Patient Personal Strengths:   expressive of emotions   expressive of needs   family/social support   positive educational history   realistic evaluation of current/future capabilities   resilient   resourceful   stable living environment   motivated for recovery   motivated for treatment  Patient Vulnerabilities:   substance abuse/addiction   poor impulse control   history of unsuccessful treatment  Taken 3/12/2022 0435 by Betty Toro, RN  Anxieties, Fears or Concerns: NONE  Goal: Optimized Coping Skills in Response to Life Stressors  Outcome: Ongoing, Progressing  Intervention: Promote Effective Coping Strategies  Flowsheets (Taken 3/12/2022 0435 by Betty Toro, RN)  Supportive Measures:   active listening utilized   verbalization of feelings encouraged   self-reflection promoted   self-responsibility promoted  Goal: Develops/Participates in Therapeutic [place] to Support Successful Transition  Outcome: Ongoing, Progressing  Intervention: Foster Therapeutic  [place]  Flowsheets (Taken 3/12/2022 1313)  Trust Relationship/Rapport:   care explained   empathic listening provided   choices provided   questions encouraged   questions answered   reassurance provided   thoughts/feelings acknowledged  Intervention: Mutually Develop Transition Plan  Flowsheets  Taken 3/12/2022 1313  Outpatient/Agency/Support Group Needs: residential services  Transition Support:   community resources reviewed   crisis management plan verbalized   follow-up care discussed   follow-up care coordinated   crisis management plan promoted  Anticipated Discharge Disposition: residential substance use unit  Taken 3/12/2022 1311  Discharge Coordination/Progress: Patient has insurance for discharge planning and signed consent for [facility].  Concerns Comments: NA  Transportation Anticipated: family or friend will provide  Transportation Concerns: no car  Current Discharge Risk:   psychiatric illness   substance use/abuse  Concerns to be Addressed:   mental health   coping/stress   compliance issue   substance/tobacco abuse/use   discharge planning   home safety  Readmission Within the Last 30 Days: no previous admission in last 30 days  Patient/Family Anticipated Services at Transition: rehabilitation services  Patient's Choice of Community Agency(s): [facility]  Patient/Family Anticipates Transition to: inpatient rehabilitation facility  Offered/Gave Vendor List: no   Goal Outcome Evaluation:  Plan of Care Reviewed With: patient, significant other  Patient Agreement with Plan of Care: agrees  Consent Given to Review Plan with: Spouse Minoo  Progress: no change  Outcome Evaluation: New admit. Completed social history and integrated summary

## 2022-03-12 NOTE — NURSING NOTE
"Pt notes from Triage report pt has new onset cough. Pt reports he has had a \"smoking cough\" for years, but nothing new. Lead RN and ED Provider made aware.  "

## 2022-03-12 NOTE — ED NOTES
MEDICAL SCREENING:    Reason for Visit: Alcohol detox; drink about 6 beverages PTA     Patient initially seen in triage.  The patient was advised further evaluation and diagnostic testing will be needed, some of the treatment and testing will be initiated in the lobby in order to begin the process.  The patient will be returned to the waiting area for the time being and possibly be re-assessed by a subsequent ED provider.  The patient will be brought back to the treatment area in as timely manner as possible.       Carmen Obregon, APRN  03/11/22 2013

## 2022-03-12 NOTE — ED PROVIDER NOTES
Subjective   41-year-old male with history of alcohol abuse hepatitis C presents the emergency room with complaints of wanting to detox from alcohol.  Patient states that he has been drinking hard for the past 3 years.  He states that he desires to be clean in order to be placed on a transplant list.  He states that he has used methamphetamines as well as marijuana recently.  He states his last drink was earlier today.  He states he drinks vodka as well as beer.  Patient denies suicidal homicidal ideation.  Denies fever chills nausea vomiting.  He does report that when he stops drinking started shakes.      Alcohol Problem  Location:  Alcohol abuse  Severity:  Moderate  Timing:  Constant  Associated symptoms: no abdominal pain, no chest pain, no cough, no myalgias, no nausea, no shortness of breath and no vomiting        Review of Systems   Respiratory: Negative for cough and shortness of breath.    Cardiovascular: Negative for chest pain.   Gastrointestinal: Negative for abdominal pain, nausea and vomiting.   Musculoskeletal: Negative for myalgias.   All other systems reviewed and are negative.      Past Medical History:   Diagnosis Date   • Alcohol abuse    • Alcoholism (HCC)    • Endocarditis    • Heart disease    • Hepatitis-C        Allergies   Allergen Reactions   • Rocephin [Ceftriaxone] Hives       Past Surgical History:   Procedure Laterality Date   • BACK SURGERY     • CARDIAC VALVE REPLACEMENT      two valves replaced    • INNER EAR SURGERY Left 1983   • KNEE SURGERY Right    • SHOULDER SURGERY Left        Family History   Problem Relation Age of Onset   • Depression Sister    • Suicide Attempts Sister        Social History     Socioeconomic History   • Marital status: Single   Tobacco Use   • Smoking status: Current Every Day Smoker     Packs/day: 1.00     Types: Cigarettes   • Smokeless tobacco: Current User   Vaping Use   • Vaping Use: Never used   Substance and Sexual Activity   • Alcohol use: Yes      Comment: 16 beers and a pint of Vodka daily   • Drug use: Yes     Types: Marijuana   • Sexual activity: Yes     Partners: Female     Birth control/protection: None           Objective   Physical Exam  Vitals and nursing note reviewed.   Constitutional:       Appearance: He is not ill-appearing.   HENT:      Head: Normocephalic and atraumatic.      Nose: Nose normal.      Mouth/Throat:      Mouth: Mucous membranes are moist.   Eyes:      Conjunctiva/sclera: Conjunctivae normal.      Pupils: Pupils are equal, round, and reactive to light.      Comments: No nystagmus.  No gaze palsy.   Cardiovascular:      Rate and Rhythm: Normal rate and regular rhythm.      Heart sounds: No murmur heard.  Pulmonary:      Effort: Pulmonary effort is normal.      Breath sounds: Normal breath sounds.   Abdominal:      General: Bowel sounds are normal.      Tenderness: There is no abdominal tenderness. There is no guarding.   Musculoskeletal:         General: Normal range of motion.      Cervical back: Neck supple.   Skin:     General: Skin is warm and dry.   Neurological:      General: No focal deficit present.      Mental Status: He is alert and oriented to person, place, and time.      Comments: No tongue fasciculations.  No tremor.   Psychiatric:      Comments: Anxious.  No SI or HI.  No active hallucinations.         Procedures           ED Course  ED Course as of 03/12/22 1253   Fri Mar 11, 2022   2209 Patient urine screen positive for amphetamines marijuana and benzodiazepines.  Patient alcohol 176.  Patient to be given IV lorazepam in light of him reporting feeling anxious.  Patient with mild shakes.  Patient oriented x3.  We will give IV magnesium for 1.3. [BB]   2236 EKG normal sinus rhythm.     no ST elevation. [BB]   2236 Patient Covid positive. [BB]   Sat Mar 12, 2022   0101 Patient repeat alcohol level 101. [BB]   0102 Patient medically cleared for psychiatric evaluation [BB]   0142 Have spoken to   [place] who has assumed care [BB]      ED Course User Index  [BB] Casey Pratt MD                                                 [facility]    Final diagnoses:   Polysubstance abuse (HCC)       ED Disposition  ED Disposition     ED Disposition   DC/Transfer to Behavioral Health    Condition   Stable    Comment   --             Jyotsna Torre, APRN  [address] [place] [address]  [phone]    Schedule an appointment as soon as possible for a visit in 1 day  EVALUATE         Medication List      No changes were made to your prescriptions during this visit.          Casey Pratt MD  03/12/22 0552

## 2022-03-12 NOTE — H&P
INITIAL PSYCHIATRIC HISTORY & PHYSICAL    Patient Identification:  Name:   Trent Tapia  Age:   41 y.o.  Sex:   male  :   1980  MRN:   [id number]  Visit Number:   [id number]  Primary Care Physician:   Jyotsna Torre APRN    SUBJECTIVE    CC/Focus of Exam: detox    HPI: Trent Tapia is a 41 y.o. male who was admitted on 3/12/2022 with complaints of drug use and withdrawals. The patient reports a long history of substance use. First use was 12 years old. Over time the use increased and the patient  continued to use despite negative consequences. The patient endorses symptoms of tolerance and withdrawals. Has tried to cut down and stop but has not been successful. Spends too much time and resources in pursuit of substance use. Longest period of sobriety is reported to be 7 days.  Currently using marijuana, meth, 16 beers, 1 pint of vodka  Last use 2022  Withdrawal symptoms nausea, diarrhea  Patient states that he uses tobacco. Patient denies any history of seizures with withdrawal. Patient states that he doesn't know why he relapsed. Patient states finances as a stressor in his life. Patient denies any history of physical,mental or sexual abuse. Patient rates his appetite as poor. Patient rates his sleep as poor. Patient denies any nightmares. Patient rates his anxiety on a scale of 1/10 with 10 being the most severe a 8. Patient denies any depression. Patient rates his cravings on a scale of 1/10 with 10 being the most severe a 10. Patient's CIWA was 9. Patient denies any suicidal ideation. Patient denies any homicidal ideation. Patient denies any hallucinations.  Patient was admitted to [facility] psychiatry for further safety and stabilization.    Available medical/psychiatric records reviewed and incorporated into the current document.     PAST PSYCHIATRIC HX: Patient has had 1 prior admission on 2020-2020. Patient denies any outpatient care.    SUBSTANCE USE  HX: UDS was positive for Methamphetamine, Amphetamine, Benzodiazepine, THC. See HPI for current use.    SOCIAL HX: Patient states that he was born in [place], [place]. Patient states that he was raised in [place], [place]. Patient states that he currently resides with his family in [facility]. Patient states that he is  and has 5 children. Patient states that 3 children lives with him and his wife and 2 children are grown. Patient states that he is currently unemployed. Patient states that he has a highschool diploma. Patient denies any legal issues.    Past Medical History:   Diagnosis Date   • Alcohol abuse    • Alcoholism (HCC)    • Endocarditis    • Heart disease    • Hepatitis-C        Past Surgical History:   Procedure Laterality Date   • BACK SURGERY     • CARDIAC VALVE REPLACEMENT      two valves replaced    • INNER EAR SURGERY Left 1983   • KNEE SURGERY Right    • SHOULDER SURGERY Left        Family History   Problem Relation Age of Onset   • Depression Sister    • Suicide Attempts Sister          No medications prior to admission.           ALLERGIES:  Rocephin [ceftriaxone]    Temp:  [97.2 °F (36.2 °C)-98.4 °F (36.9 °C)] 98.4 °F (36.9 °C)  Heart Rate:  [] 92  Resp:  [18] 18  BP: (106-145)/(57-82) 106/57    REVIEW OF SYSTEMS:  Review of Systems   See HPI for psychiatric ROS  OBJECTIVE    PHYSICAL EXAM:  Physical Exam    MENTAL STATUS EXAM:               Hygiene:   fair  Cooperation:  Cooperative  Eye Contact:  Good  Psychomotor Behavior:  Appropriate  Affect:  Appropriate  Hopelessness: 5  Speech:  Normal  Linear  Thought Content:  Normal  Suicidal:  None  Homicidal:  None  Hallucinations:  None  Delusion:  None  Memory:  Intact  Orientation:  Person, Place, Time and Situation  Reliability:  fair  Insight:  Fair  Judgement:  Fair  Impulse Control:  Poor      Imaging Results (Last 24 Hours)     ** No results found for the last 24 hours. **           ECG/EMG Results (most recent)     None            Lab Results   Component Value Date    GLUCOSE 99 03/11/2022    BUN 4 (L) 03/11/2022    CREATININE 0.81 03/11/2022    EGFRIFNONA 138 06/13/2020    BCR 4.9 (L) 03/11/2022    CO2 25.4 03/11/2022    CALCIUM 8.5 (L) 03/11/2022    ALBUMIN 3.68 03/11/2022    AST 80 (H) 03/11/2022    ALT 32 03/11/2022       Lab Results   Component Value Date    WBC 7.82 03/11/2022    HGB 13.5 03/11/2022    HCT 39.9 03/11/2022    MCV 92.1 03/11/2022    PLT 89 (L) 03/11/2022       Pain Management Panel     Pain Management Panel Latest Ref Rng & Units 3/11/2022 3/5/2022    AMPHETAMINES SCREEN, URINE Negative Positive(A) Negative    BARBITURATES SCREEN Negative Negative Negative    BENZODIAZEPINE SCREEN, URINE Negative Positive(A) Positive(A)    BUPRENORPHINEUR Negative Negative Negative    COCAINE SCREEN, URINE Negative Negative Negative    METHADONE SCREEN, URINE Negative Negative Negative    METHAMPHETAMINEUR Negative Positive(A) Negative          Brief Urine Lab Results  (Last result in the past 365 days)      Color   Clarity   Blood   Leuk Est   Nitrite   Protein   CREAT   Urine HCG        03/05/22 2311 Yellow   Clear   Negative   Negative   Negative   Negative                 Reviewed labs and studies done with this admission.       ASSESSMENT & PLAN:    Alcohol use disorder severe dependence   Alcohol withdrawals  Admit to detox unit for medication assisted detox  Ativan detox per protocol  Multivitamin, Thiamine, Folic acid    Methamphetamine use disorder   Supportive therapy, encouraged abstinence   Benzodiazepine use disorder   Ativan protocol    Marijuana use disorder   Supportive therapy  Encouraged abstinence    Previous history of COVID positive     Hypokalemia - potassium 3.2, potassium replacement per protocol.          The patient has been admitted for safety and stabilization.  Patient will be monitored for suicidality daily and maintained on Special Precautions Level 4 (q30 min checks)Special  Precautions Level 4 (q30 min  checks).  The patient will have individual and group therapy with a master's level therapist. A master treatment plan will be developed and agreed upon by the patient and his/her treatment team.  The patient's estimated length of stay in the hospital is 5-7 days.       Written by Karla Benavidez acting as scribe for Dr.Snehamala Staples signature on this note affirms that the note adequately documents the care provided.   This note was generated using a scribe,   Karla Benavidez MA  03/12/22  11:44 AM EST

## 2022-03-12 NOTE — NURSING NOTE
Intake assessment completed at this time. Pt presents to Intake or detox from ETOH. Pt has been to the ED on 03/01/22, 03/05/22, and today for alcohol intoxication. Pt was unable to be admitted on the 2 previous visits to ED d/t positive Covid test. Pt reports he drank 2 [employer] [place] 03/11/22 approx 3pm. Triage reports pt stated he drank 6 alcoholic beverages today. Approx dusk, pt signed into [facility]. Pt brought here for evaluation for medical detox. Pt states he has been admitted multiple times before for detox, but was never wanting to get off alcohol. Pt states he now wishes to detox. Last admission here for Detox was 06/13- 06/14/2020. Pt left AMA at that time.    Labs  Platelets 89  BUN 4  K+ 3.2  Ca 8.5  AST 80  Mag 1.3, replacement given in ED.  Covid +  UDS positive for THC, meth, benzos, amphetamines. Pt denies any drug use except marijuana and ETOH.    Anxiety 8 depression 2 craving 9 on scale of 0-10. Sleep & appetite poor.  Pt denies SI/HI/AVH.    CIWA 9    Meds given Ativan 0.5 mg IV, Banana Bag, Magnesium 1g IV in ED.    Pt A&Ox 3.

## 2022-03-12 NOTE — PLAN OF CARE
Goal Outcome Evaluation:  Plan of Care Reviewed With: patient  Patient Agreement with Plan of Care: agrees     Progress: no change  Outcome Evaluation: Pt is new pt this shift. Pt presents calm and cooperative.

## 2022-03-12 NOTE — PROGRESS NOTES
"    DATA:      Therapist met individually with patient this date to introduce role and to discuss hospitalization expectations. Patient agreeable. Reviewed medical record and staffed case with treatment team this date. No major issues identified.       Patient signed consent for his spouse iMnoo [phone]; Minoo was supportive this date  \"He's trying to stop drinking. Just needs to do the detox first.\"  Minoo reports that she is supportive of patient going to rehab and that she can safe guard firearm at home in the event that patient returns home.  She reports that there are no bullets at home.       Clinical Maneuvering/Intervention:     Therapist assisted patient in processing above session content; acknowledged and normalized patient’s thoughts, feelings, and concerns.  Discussed the therapist/patient relationship and explain the parameters and limitations of relative confidentiality.  Also discussed the importance of active participation, and honesty to the treatment process.  Encouraged the patient to discuss/vent their feelings, frustrations, and fears concerning their ongoing medical issues and validated their feelings.     Allowed patient to freely discuss issues without interruption or judgment. Provided safe, confidential environment to facilitate the development of positive therapeutic relationship and encourage open, honest communication.      Therapist addressed discharge safety planning this date. Assisted patient in identifying risk factors which would indicate the need for higher level of care after discharge;  including thoughts to harm self or others and/or self-harming behavior. Encouraged patient to call 911, or present to the nearest emergency room should any of these events occur. Discussed crisis intervention services and means to access.  Encouraged securing any objects of harm.       Therapist completed integrated summary, treatment plan, and initiated social history this date.  " "Therapist is strongly encouraging family involvement in treatment.       ASSESSMENT:      The patient is a 41 year old male. Per report, \"Intake assessment completed at this time. Pt presents to Intake or detox from ETOH. Pt has been to the ED on 03/01/22, 03/05/22, and today for alcohol intoxication. Pt was unable to be admitted on the 2 previous visits to ED d/t positive Covid test. Pt reports he drank 2 [place] 03/11/22 approx 3pm. Triage reports pt stated he drank 6 alcoholic beverages today. Approx dusk, pt signed into [facility]. Pt brought here for evaluation for medical detox. Pt states he has been admitted multiple times before for detox, but was never wanting to get off alcohol. Pt states he now wishes to detox. Last admission here for Detox was 06/13- 06/14/2020. Pt left AMA at that time.\"     Today, patient was seen 1-1 in the office. Patient calm/cooperative and oriented x 4.  Patient reports that he is here to get help with alcohol and drugs and that he plans to go to [facility] after.  Patient denies SI/HI/AVH.  Patient rates depression at 0/10 and anxiety at 8/10.  Patient endorses some withdrawal symptoms; anxiety, tremors, \"mind racing.\"      Mental Status Exam:    Hygiene:   fair  Cooperation:  Cooperative  Eye Contact:  Good  Psychomotor Behavior:  Appropriate  Affect:  Appropriate  Speech:  Normal and Minimal  Goal directed  Thought Content:  Normal  Suicidal:  None  Homicidal:  None  Hallucinations:  None  Delusion:  None  Memory:  Intact  Orientation:  Person, Place, Time and Situation  Reliability:  fair  Insight:  Fair  Judgement:  Fair  Impulse Control:  Impaired      Goals for treatment:      Prior Hospitalizations / Dates: Reports multiple detox admissions; last being [facility] 6/13/2020. Patient diagnosed with alcohol use disorder and left hospital AMA.      Childhood History:  Raised in [place] by family.  Currently resides with family in [facility] [place] Denies history of abuse       Suicide Attempts:  Denies      Alcohol:   Currently using 1 pint Vodka daily, 16 beers.  Last use 3/11/22.     Substance use: Started using at 12.  Currently using THC, Methamphetamine.     Legal:  Denies current. History of burglary in 1998 and spent 5 years in custodial. Denies probation or parole.      Sexual:  with 5 children. Reports spouse if sober and cares for family.      Education:   12th grade, unemployed.  Residing with family      Access to firearms:  Denies         PLAN:       Patient to remain hospitalized this date.      Treatment team will focus efforts on stabilizing patient's acute symptoms while providing education on healthy coping and crisis management to reduce hospitalizations.   Patient requires daily psychiatrist evaluation and 24/7 nursing supervision to promote patient  safety.     Therapist will offer 1-4 individual sessions, family education, and appropriate referral.     Therapist recommends residential referral. Patient signed consent for [facility]. Patient plans to go there at discharge. Please contact patient's wife Minoo for update Monday if possible.

## 2022-03-12 NOTE — NURSING NOTE
Pt presents requesting detox from ETOH. Pt has been to the ED on 03/01/22, 03/05/22, and today for alcohol intoxication. Pt was unable to be admitted on the 2 previous visits to ED d/t positive Covid test. Pt reports he drank 2 [place] 03/11/22 approx 3pm. Triage reports pt stated he drank 6 alcoholic beverages today. Approx dusk, pt signed into [facility]. Pt brought here for evaluation for medical detox. Pt states he has been admitted multiple times before for detox, but was never wanting to get off alcohol. Pt states he now wishes to detox. Last admission here for Detox was 06/13- 06/14/2020. Pt left AMA at that time.       ////Per pt he drinks 12-16 beers and a pint of vodka daily also smokes aprox 3 joints dly and used meth 3-4 days ago, but denies freq use..  Pt was also positive for benzos however denies any other drug use at this time.

## 2022-03-13 LAB
MAGNESIUM SERPL-MCNC: 1.4 MG/DL (ref 1.6–2.6)
POTASSIUM SERPL-SCNC: 3.6 MMOL/L (ref 3.5–5.2)

## 2022-03-13 PROCEDURE — 84132 ASSAY OF SERUM POTASSIUM: CPT | Performed by: PSYCHIATRY & NEUROLOGY

## 2022-03-13 PROCEDURE — 83735 ASSAY OF MAGNESIUM: CPT | Performed by: PSYCHIATRY & NEUROLOGY

## 2022-03-13 PROCEDURE — 99231 SBSQ HOSP IP/OBS SF/LOW 25: CPT | Performed by: PSYCHIATRY & NEUROLOGY

## 2022-03-13 RX ADMIN — LORAZEPAM 2 MG: 2 TABLET ORAL at 21:12

## 2022-03-13 RX ADMIN — HYDROXYZINE HYDROCHLORIDE 50 MG: 50 TABLET ORAL at 21:12

## 2022-03-13 RX ADMIN — Medication 100 MG: at 09:16

## 2022-03-13 RX ADMIN — LORAZEPAM 2 MG: 2 TABLET ORAL at 08:49

## 2022-03-13 RX ADMIN — MAGNESIUM GLUCONATE 500 MG ORAL TABLET 400 MG: 500 TABLET ORAL at 09:23

## 2022-03-13 RX ADMIN — Medication 1 TABLET: at 08:48

## 2022-03-13 RX ADMIN — MAGNESIUM GLUCONATE 500 MG ORAL TABLET 400 MG: 500 TABLET ORAL at 21:12

## 2022-03-13 RX ADMIN — LORAZEPAM 2 MG: 2 TABLET ORAL at 14:40

## 2022-03-13 RX ADMIN — Medication 2 TABLET: at 08:49

## 2022-03-13 RX ADMIN — TRAZODONE HYDROCHLORIDE 50 MG: 50 TABLET ORAL at 21:12

## 2022-03-13 NOTE — PROGRESS NOTES
"INPATIENT PSYCHIATRIC PROGRESS NOTE    Name:  Trent Tapia  :  1980  MRN:  [id number]  Visit Number:  [id number]  Length of stay:  1    SUBJECTIVE  CC/Focus of Exam: Alcohol detox    INTERVAL HISTORY:  Patient is seen for follow up, he reported he does not feel his withdrawals set in yet, he told staff he was having tremors, sweats, bodyaches.  Depression rating 0/10  Anxiety rating 0/10  Sleep: good  Withdrawal sx: tramors, sweats, bodyaches  Cravin-10/10    Review of Systems  Per interval history  OBJECTIVE    Temp:  [97.5 °F (36.4 °C)-98.9 °F (37.2 °C)] 98.6 °F (37 °C)  Heart Rate:  [66-98] 66  Resp:  [16-18] 18  BP: ()/(53-83) 96/54    MENTAL STATUS EXAM:  Appearance: Casually dressed, good hygeine.   Cooperation: Cooperative  Psychomotor: No psychomotor agitation/retardation, No EPS, No motor tics  Speech: normal rate, amount.  Mood: \" ok I geuss\"   Affect: congruent, appropriate  Thought Content: goal directed, no delusional material present  Thought process: linear, organized.  Suicidality: No SI  Homicidality: No HI  Perception: No AH/VH  Insight: fair   Judgment: fair    Lab Results (last 24 hours)     Procedure Component Value Units Date/Time    Potassium [[id number]]  (Normal) Collected: 22 0507    Specimen: Blood Updated: 22 0611     Potassium 3.6 mmol/L     Magnesium [[id number]]  (Abnormal) Collected: 22 0507    Specimen: Blood Updated: 22 0611     Magnesium 1.4 mg/dL     Potassium [[id number]]  (Normal) Collected: 22 1430    Specimen: Blood Updated: 22 1510     Potassium 3.8 mmol/L     Magnesium [[id number]]  (Abnormal) Collected: 22 1430    Specimen: Blood Updated: 22 1510     Magnesium 1.3 mg/dL              Imaging Results (Last 24 Hours)     ** No results found for the last 24 hours. **             ECG/EMG Results (most recent)     None           ALLERGIES: Rocephin [ceftriaxone]      Current Facility-Administered Medications: "   •  aluminum-magnesium hydroxide-simethicone (MAALOX MAX) 400-400-40 MG/5ML suspension 15 mL, 15 mL, Oral, Q6H PRN, Allyson Staples MD  •  B-complex with vitamin C tablet 2 tablet, 2 tablet, Oral, Daily, Allyson Staples MD, 2 tablet at 03/13/22 0849  •  benzonatate (TESSALON) capsule 100 mg, 100 mg, Oral, TID PRN, Allyson Staples MD  •  benztropine (COGENTIN) tablet 2 mg, 2 mg, Oral, Once PRN **OR** benztropine (COGENTIN) injection 1 mg, 1 mg, Intramuscular, Once PRN, Allyson Staples MD  •  famotidine (PEPCID) tablet 20 mg, 20 mg, Oral, BID PRN, Allyson Staples MD  •  hydrOXYzine (ATARAX) tablet 50 mg, 50 mg, Oral, Q6H PRN, Allyson Staples MD, 50 mg at 03/12/22 2128  •  ibuprofen (ADVIL,MOTRIN) tablet 400 mg, 400 mg, Oral, Q6H PRN, Allyson Staples MD, 400 mg at 03/12/22 2128  •  loperamide (IMODIUM) capsule 2 mg, 2 mg, Oral, Q2H PRN, Allyson Staples MD  •  LORazepam (ATIVAN) tablet 2 mg, 2 mg, Oral, 3 times per day, 2 mg at 03/13/22 0849 **FOLLOWED BY** [START ON 3/14/2022] LORazepam (ATIVAN) tablet 1.5 mg, 1.5 mg, Oral, 3 times per day **FOLLOWED BY** [START ON 3/15/2022] LORazepam (ATIVAN) tablet 1 mg, 1 mg, Oral, 3 times per day **FOLLOWED BY** [START ON 3/16/2022] LORazepam (ATIVAN) tablet 0.5 mg, 0.5 mg, Oral, 3 times per day, Allyson Staples MD  •  LORazepam (ATIVAN) tablet 2 mg, 2 mg, Oral, Q4H PRN **FOLLOWED BY** [START ON 3/14/2022] LORazepam (ATIVAN) tablet 1.5 mg, 1.5 mg, Oral, Q4H PRN **FOLLOWED BY** [START ON 3/15/2022] LORazepam (ATIVAN) tablet 1 mg, 1 mg, Oral, Q4H PRN **FOLLOWED BY** [START ON 3/16/2022] LORazepam (ATIVAN) tablet 0.5 mg, 0.5 mg, Oral, Q4H PRN, Allyson Staples MD  •  magnesium hydroxide (MILK OF MAGNESIA) suspension 10 mL, 10 mL, Oral, Daily PRN, Allyson Staples MD  •  magnesium oxide (MAG-OX) tablet 400 mg, 400 mg, Oral, BID, Allyson Staples MD, 400 mg at 03/13/22 0924  •  multivitamin with minerals  1 tablet, 1 tablet, Oral, Daily, Allyson Staples MD, 1 tablet at 03/13/22 0848  •  nicotine (NICODERM CQ) 21 MG/24HR patch 1 patch, 1 patch, Transdermal, Q24H, Allyson Staples MD  •  ondansetron (ZOFRAN) tablet 4 mg, 4 mg, Oral, Q6H PRN, Allyson Staples MD, 4 mg at 03/12/22 2128  •  sodium chloride nasal spray 2 spray, 2 spray, Each Nare, PRN, Allyson Staples MD  •  thiamine (VITAMIN B-1) tablet 100 mg, 100 mg, Oral, Daily, Allyson Staples MD, 100 mg at 03/13/22 0916  •  traZODone (DESYREL) tablet 50 mg, 50 mg, Oral, Nightly PRN, Allyson Staples MD, 50 mg at 03/12/22 2128    Reviewed chart, notes, vitals, labs and EKG personally    ASSESSMENT & PLAN:  -Alcohol use disorder severe dependence    Alcohol withdrawals   Ativan detox per protocol   Multivitamin, Thiamine, Folic acid      Methamphetamine use disorder    Supportive therapy, encouraged abstinence      Benzodiazepine use disorder    Ativan protocol      Marijuana use disorder    Supportive therapy   Encouraged abstinence     Previous history of COVID positive - no symptoms      Hypokalemia - corrected, resolved  Hyomagnesemia - Magnesium 1.3-->1.4   Magnesium oxide 400 mg po bid, to recheck Magnesium tomorrow am            Special precautions: Special Precautions Level 4 (q30 min checks)    Behavioral Health Treatment Plan and Problem List: I have reviewed and approved the Behavioral Health Treatment Plan and Problem list.  The patient has had a chance to review and agrees with the treatment plan.     Clinician:  Allyson Staples MD  03/13/22  13:00 EDT

## 2022-03-13 NOTE — PLAN OF CARE
Goal Outcome Evaluation:  Plan of Care Reviewed With: patient  Patient Agreement with Plan of Care: agrees     Progress: improving  Outcome Evaluation: Patient has spent most of the day resting in bed. Rates anxiety 3/10. Depression 1/10. Cravings 5/10. Denies wd's Si, HI, or foss. No other issues noted at this time. Will continue to monitor.   no

## 2022-03-13 NOTE — NURSING NOTE
Safety rounds were made every 30 minutes this shift. Daylight saving affected charting from 2 am to 3 am

## 2022-03-13 NOTE — PLAN OF CARE
Goal Outcome Evaluation:  Plan of Care Reviewed With: patient        Progress: no change  Outcome Evaluation: Pt reports that he has felt bad this shift. Reports his craving is a 9 and withdrawals are tremors sweats and body aches.

## 2022-03-14 LAB
MAGNESIUM SERPL-MCNC: 1.6 MG/DL (ref 1.6–2.6)
POTASSIUM SERPL-SCNC: 3.7 MMOL/L (ref 3.5–5.2)

## 2022-03-14 PROCEDURE — 84132 ASSAY OF SERUM POTASSIUM: CPT | Performed by: PSYCHIATRY & NEUROLOGY

## 2022-03-14 PROCEDURE — 83735 ASSAY OF MAGNESIUM: CPT | Performed by: PSYCHIATRY & NEUROLOGY

## 2022-03-14 PROCEDURE — 99232 SBSQ HOSP IP/OBS MODERATE 35: CPT | Performed by: PSYCHIATRY & NEUROLOGY

## 2022-03-14 RX ADMIN — MAGNESIUM GLUCONATE 500 MG ORAL TABLET 400 MG: 500 TABLET ORAL at 21:17

## 2022-03-14 RX ADMIN — Medication 2 TABLET: at 10:06

## 2022-03-14 RX ADMIN — LORAZEPAM 1.5 MG: 1 TABLET ORAL at 21:16

## 2022-03-14 RX ADMIN — HYDROXYZINE HYDROCHLORIDE 50 MG: 50 TABLET ORAL at 08:41

## 2022-03-14 RX ADMIN — LORAZEPAM 1.5 MG: 1 TABLET ORAL at 08:41

## 2022-03-14 RX ADMIN — Medication 100 MG: at 08:41

## 2022-03-14 RX ADMIN — HYDROXYZINE HYDROCHLORIDE 50 MG: 50 TABLET ORAL at 21:16

## 2022-03-14 RX ADMIN — Medication 1 TABLET: at 08:41

## 2022-03-14 RX ADMIN — MAGNESIUM GLUCONATE 500 MG ORAL TABLET 400 MG: 500 TABLET ORAL at 08:41

## 2022-03-14 RX ADMIN — LORAZEPAM 1.5 MG: 1 TABLET ORAL at 14:15

## 2022-03-14 RX ADMIN — TRAZODONE HYDROCHLORIDE 50 MG: 50 TABLET ORAL at 21:16

## 2022-03-14 NOTE — PLAN OF CARE
Problem: Adult Behavioral Health Plan of Care  Goal: Optimized Coping Skills in Response to Life Stressors  Outcome: Ongoing, Progressing  Flowsheets (Taken 3/14/2022 1601)  Optimized Coping Skills in Response to Life Stressors: making progress toward outcome  Intervention: Promote Effective Coping Strategies  Flowsheets (Taken 3/14/2022 1601)  Supportive Measures:   active listening utilized   counseling provided   decision-making supported   goal-setting facilitated   positive reinforcement provided   problem-solving facilitated   relaxation techniques promoted   self-care encouraged   self-reflection promoted   self-responsibility promoted   verbalization of feelings encouraged  Goal: Develops/Participates in Therapeutic [place] to Support Successful Transition  Outcome: Ongoing, Progressing  Flowsheets (Taken 3/14/2022 1601)  Develops/Participates in Therapeutic [place] to Support Successful Transition: making progress toward outcome  Intervention: Foster Therapeutic [place]  Flowsheets (Taken 3/13/2022 0820 by Cortney Buckley, RN)  Trust Relationship/Rapport:   care explained   choices provided   emotional support provided   empathic listening provided   questions answered   questions encouraged   reassurance provided   thoughts/feelings acknowledged  Intervention: Mutually Develop Transition Plan  Flowsheets  Taken 3/14/2022 1601 by Shraddha Peña  Offered/Gave Vendor List: no  Taken 3/12/2022 1313 by Lisbet Urena  Outpatient/Agency/Support Group Needs: residential services  Transition Support:   community resources reviewed   crisis management plan verbalized   follow-up care discussed   follow-up care coordinated   crisis management plan promoted  Anticipated Discharge Disposition: residential substance use unit  Taken 3/12/2022 1311 by Lisbet Urena  Discharge Coordination/Progress: Patient has insurance for discharge planning and signed consent for [facility]  Recovery.  Transportation Anticipated: family or friend will provide  Transportation Concerns: no car  Current Discharge Risk:   psychiatric illness   substance use/abuse  Concerns to be Addressed:   mental health   coping/stress   compliance issue   substance/tobacco abuse/use   discharge planning   home safety  Readmission Within the Last 30 Days: no previous admission in last 30 days  Patient/Family Anticipated Services at Transition: rehabilitation services  Patient's Choice of Community Agency(s): [facility]  Patient/Family Anticipates Transition to: inpatient rehabilitation facility        DATA:      Therapist discussed case with RN and met with patient today to review coping skills, review plan of care, and discuss discharge.    Navigator is following up with [facility].      Clinical Maneuvering/Intervention:     Therapist assisted patient in processing above session content; acknowledged and normalized patient’s thoughts, feelings, and concerns.  Discussed the therapist/patient relationship and explain the parameters and limitations of relative confidentiality.  Also discussed the importance of active participation, and honesty to the treatment process.  Encouraged the patient to discuss/vent their feelings, frustrations, and fears concerning their ongoing medical issues and validated their feelings.     Allowed patient to freely discuss issues without interruption or judgment. Provided safe, confidential environment to facilitate the development of positive therapeutic relationship and encourage open, honest communication.      Therapist addressed discharge safety planning this date. Assisted patient in identifying risk factors which would indicate the need for higher level of care after discharge;  including thoughts to harm self or others and/or self-harming behavior. Encouraged patient to call 911, or present to the nearest emergency room should any of these events occur.  Discussed crisis intervention services and means to access.  Encouraged securing any objects of harm.    Therapist completed integrated summary, treatment plan, and initiated social history this date.  Therapist is strongly encouraging family involvement in treatment.       Encouraged mask wearing, social distancing, and regular hand washing due to COVID19 risk.      ASSESSMENT:      Therapist met 1:1 with patient today. Patient denies suicidal ideation. Patient denies homicidal ideation. Patient denies AVH. Patient denies anxiety and depression. He reports feeling better today. Patient appears motivated for treatment and recovery. He reports he is ready to get sober. Patient states he is motivated for recovery because he knows he will die if he does not maintain sobriety. Patient reports he does not want his physical health to continue to decline. He states he is excited for his next step, asking a couple of times when he will be allowed to go to rehab. Patient denies withdrawal symptoms, slight tremor observed by RN.       PLAN:       Patient to remain hospitalized this date.      Treatment team will focus efforts on stabilizing patient's acute symptoms while providing education on healthy coping and crisis management to reduce hospitalizations.   Patient requires daily psychiatrist evaluation and 24/7 nursing supervision to promote patient  safety.     Therapist will offer 1-4 individual sessions, 1 therapy group daily, family education, and appropriate referral.

## 2022-03-14 NOTE — PROGRESS NOTES
1125: Spoke with [facility]. They will accept patient at discharge and provide transportation.     940: Navigator is helping Primary Therapist with discharge planning for patient. Navigator attempted to reach [facility] with [facility]. No answer at this time. No voicemail.     [facility] - [phone]

## 2022-03-14 NOTE — PLAN OF CARE
Problem: Adult Behavioral Health Plan of Care  Goal: Plan of Care Review  Outcome: Ongoing, Progressing  Flowsheets  Taken 3/14/2022 0635  Progress: improving  Plan of Care Reviewed With: patient  Patient Agreement with Plan of Care: agrees  Taken 3/13/2022 2019  Plan of Care Reviewed With: patient  Patient Agreement with Plan of Care: agrees  Goal: Patient-Specific Goal (Individualization)  Outcome: Ongoing, Progressing  Goal: Adheres to Safety Considerations for Self and Others  Outcome: Ongoing, Progressing  Intervention: Develop and Maintain Individualized Safety Plan  Recent Flowsheet Documentation  Taken 3/14/2022 0600 by Kianna Bradford RN  Safety Measures: safety rounds completed  Taken 3/14/2022 0400 by Kianna Bradford RN  Safety Measures: safety rounds completed  Taken 3/14/2022 0200 by Kianna Bradford RN  Safety Measures: safety rounds completed  Taken 3/14/2022 0000 by Kianna Bradford RN  Safety Measures: safety rounds completed  Taken 3/13/2022 2200 by Kianna Bradford RN  Safety Measures: safety rounds completed  Taken 3/13/2022 2001 by Kianna Bradford RN  Safety Measures: safety rounds completed  Goal: Absence of New-Onset Illness or Injury  Outcome: Ongoing, Progressing  Intervention: Identify and Manage Fall Risk  Recent Flowsheet Documentation  Taken 3/14/2022 0600 by Kianna Bradford RN  Safety Measures: safety rounds completed  Taken 3/14/2022 0400 by Kianna Bradford RN  Safety Measures: safety rounds completed  Taken 3/14/2022 0200 by Kianna Bradford RN  Safety Measures: safety rounds completed  Taken 3/14/2022 0000 by Kianna Bradford RN  Safety Measures: safety rounds completed  Taken 3/13/2022 2200 by Kianna Bradford RN  Safety Measures: safety rounds completed  Taken 3/13/2022 2001 by Kianna Bradford RN  Safety Measures: safety rounds completed  Goal: Optimized Coping Skills  in Response to Life Stressors  Outcome: Ongoing, Progressing  Goal: Develops/Participates in Therapeutic [place] to Support Successful Transition  Outcome: Ongoing, Progressing   Goal Outcome Evaluation:  Plan of Care Reviewed With: patient  Patient Agreement with Plan of Care: agrees     Progress: improving

## 2022-03-14 NOTE — PROGRESS NOTES
"INPATIENT PSYCHIATRIC PROGRESS NOTE    Name:  Trent Tapia  :  1980  MRN:  [id number]  Visit Number:  [id number]  Length of stay:  2    SUBJECTIVE  CC/Focus of Exam: alcohol use     INTERVAL HISTORY:  The patient reports he is feeling better and the medications are helping with the withdrawal symptoms.   Depression rating 2/10  Anxiety rating 4/10  Sleep: up and down  Withdrawal sx: denies  Cravin/10    Review of Systems   Constitutional: Negative.    Respiratory: Negative.    Cardiovascular: Negative.    Gastrointestinal: Negative.    Psychiatric/Behavioral: The patient is nervous/anxious.        OBJECTIVE    Temp:  [98.1 °F (36.7 °C)-98.4 °F (36.9 °C)] 98.3 °F (36.8 °C)  Heart Rate:  [79-86] 79  Resp:  [16-18] 18  BP: ()/(57-90) 94/57    MENTAL STATUS EXAM:  Appearance:Casually dressed, good hygeine.   Cooperation:Cooperative  Psychomotor: No psychomotor agitation/retardation, No EPS, No motor tics  Speech-normal rate, amount.  Mood \"anxious\"   Affect-congruent, appropriate, stable  Thought Content-goal directed, no delusional material present  Thought process-linear, organized.  Suicidality: No SI  Homicidality: No HI  Perception: No AH/VH  Insight-fair   Judgement-fair    Lab Results (last 24 hours)     Procedure Component Value Units Date/Time    Potassium [[id number]]  (Normal) Collected: 22    Specimen: Blood Updated: 22     Potassium 3.7 mmol/L     Magnesium [[id number]]  (Normal) Collected: 22    Specimen: Blood Updated: 22     Magnesium 1.6 mg/dL              Imaging Results (Last 24 Hours)     ** No results found for the last 24 hours. **             ECG/EMG Results (most recent)     None           ALLERGIES: Rocephin [ceftriaxone]      Current Facility-Administered Medications:   •  aluminum-magnesium hydroxide-simethicone (MAALOX MAX) 400-400-40 MG/5ML suspension 15 mL, 15 mL, Oral, Q6H PRN, Allyson Staples MD  •  B-complex " with vitamin C tablet 2 tablet, 2 tablet, Oral, Daily, Allyson Staples MD, 2 tablet at 22 1006  •  benzonatate (TESSALON) capsule 100 mg, 100 mg, Oral, TID PRN, Allyson Staples MD  •  benztropine (COGENTIN) tablet 2 mg, 2 mg, Oral, Once PRN **OR** benztropine (COGENTIN) injection 1 mg, 1 mg, Intramuscular, Once PRN, Allyson Staples MD  •  famotidine (PEPCID) tablet 20 mg, 20 mg, Oral, BID PRN, Allyson Staples MD  •  hydrOXYzine (ATARAX) tablet 50 mg, 50 mg, Oral, Q6H PRN, Allyson Staples MD, 50 mg at 22 0841  •  ibuprofen (ADVIL,MOTRIN) tablet 400 mg, 400 mg, Oral, Q6H PRN, Allyson Staples MD, 400 mg at 228  •  loperamide (IMODIUM) capsule 2 mg, 2 mg, Oral, Q2H PRN, Allyson Staples MD  •  [COMPLETED] LORazepam (ATIVAN) tablet 2 mg, 2 mg, Oral, 3 times per day, 2 mg at 222 **FOLLOWED BY** LORazepam (ATIVAN) tablet 1.5 mg, 1.5 mg, Oral, 3 times per day, 1.5 mg at 22 0841 **FOLLOWED BY** [START ON 3/15/2022] LORazepam (ATIVAN) tablet 1 mg, 1 mg, Oral, 3 times per day **FOLLOWED BY** [START ON 3/16/2022] LORazepam (ATIVAN) tablet 0.5 mg, 0.5 mg, Oral, 3 times per day, Allyson Staples MD  •  [] LORazepam (ATIVAN) tablet 2 mg, 2 mg, Oral, Q4H PRN **FOLLOWED BY** LORazepam (ATIVAN) tablet 1.5 mg, 1.5 mg, Oral, Q4H PRN **FOLLOWED BY** [START ON 3/15/2022] LORazepam (ATIVAN) tablet 1 mg, 1 mg, Oral, Q4H PRN **FOLLOWED BY** [START ON 3/16/2022] LORazepam (ATIVAN) tablet 0.5 mg, 0.5 mg, Oral, Q4H PRN, Allyson Staples MD  •  magnesium hydroxide (MILK OF MAGNESIA) suspension 10 mL, 10 mL, Oral, Daily PRN, Allyson Staples MD  •  magnesium oxide (MAG-OX) tablet 400 mg, 400 mg, Oral, BID, Allyson Staples MD, 400 mg at 22 0841  •  multivitamin with minerals 1 tablet, 1 tablet, Oral, Daily, Allyson Staples MD, 1 tablet at 22 0841  •  nicotine (NICODERM CQ) 21 MG/24HR patch 1 patch, 1 patch,  Transdermal, Q24H, Allyson Stpales MD  •  ondansetron (ZOFRAN) tablet 4 mg, 4 mg, Oral, Q6H PRN, Allyson Staples MD, 4 mg at 03/12/22 2128  •  sodium chloride nasal spray 2 spray, 2 spray, Each Nare, PRN, Allyson Staples MD  •  thiamine (VITAMIN B-1) tablet 100 mg, 100 mg, Oral, Daily, Allyson Staples MD, 100 mg at 03/14/22 0841  •  traZODone (DESYREL) tablet 50 mg, 50 mg, Oral, Nightly PRN, Allyson Staples MD, 50 mg at 03/13/22 2112    ASSESSMENT & PLAN:     Alcohol use disorder severe dependence    Alcohol withdrawals   - Ativan detox per protocol   - Multivitamin, Thiamine, Folic acid      Methamphetamine use disorder    - Supportive therapy, encouraged abstinence       Benzodiazepine use disorder    - Ativan detox protocol      Marijuana use disorder    - Supportive therapy   - Encouraged abstinence     Hypomagnesemia  - Resolved      Special precautions: Special Precautions Level 4 (q30 min checks).    Behavioral Health Treatment Plan and Problem List: I have reviewed and approved the Behavioral Health Treatment Plan and Problem list.  The patient has had a chance to review and agrees with the treatment plan.     Clinician:  Queenie Markham MD  03/14/22  13:08 EDT

## 2022-03-14 NOTE — PLAN OF CARE
Problem: Adult Behavioral Health Plan of Care  Goal: Plan of Care Review  Outcome: Ongoing, Progressing  Flowsheets (Taken 3/14/2022 1705)  Plan of Care Reviewed With: patient  Patient Agreement with Plan of Care: agrees  Outcome Evaluation: client slept a lot today. calm and cooperative.   Goal Outcome Evaluation:  Plan of Care Reviewed With: patient  Patient Agreement with Plan of Care: agrees        Outcome Evaluation: client slept a lot today. calm and cooperative.

## 2022-03-15 VITALS
DIASTOLIC BLOOD PRESSURE: 86 MMHG | WEIGHT: 195.2 LBS | HEART RATE: 90 BPM | BODY MASS INDEX: 26.44 KG/M2 | RESPIRATION RATE: 20 BRPM | HEIGHT: 72 IN | SYSTOLIC BLOOD PRESSURE: 132 MMHG | TEMPERATURE: 98 F | OXYGEN SATURATION: 96 %

## 2022-03-15 LAB
MAGNESIUM SERPL-MCNC: 1.5 MG/DL (ref 1.6–2.6)
POTASSIUM SERPL-SCNC: 3.7 MMOL/L (ref 3.5–5.2)

## 2022-03-15 PROCEDURE — 83735 ASSAY OF MAGNESIUM: CPT | Performed by: PSYCHIATRY & NEUROLOGY

## 2022-03-15 PROCEDURE — 99238 HOSP IP/OBS DSCHRG MGMT 30/<: CPT | Performed by: PSYCHIATRY & NEUROLOGY

## 2022-03-15 PROCEDURE — 84132 ASSAY OF SERUM POTASSIUM: CPT | Performed by: PSYCHIATRY & NEUROLOGY

## 2022-03-15 RX ADMIN — LORAZEPAM 1 MG: 1 TABLET ORAL at 08:18

## 2022-03-15 RX ADMIN — LORAZEPAM 1 MG: 1 TABLET ORAL at 14:23

## 2022-03-15 RX ADMIN — HYDROXYZINE HYDROCHLORIDE 50 MG: 50 TABLET ORAL at 08:18

## 2022-03-15 RX ADMIN — Medication 1 TABLET: at 08:18

## 2022-03-15 RX ADMIN — Medication 2 TABLET: at 08:18

## 2022-03-15 RX ADMIN — Medication 100 MG: at 08:18

## 2022-03-15 NOTE — PROGRESS NOTES
Behavioral Health Discharge Summary             Please fax within 24 hours of discharge to [facility] at: [phone]      Member Name: Trent Tapia Member ID: [id number]   Authorization Number: [id number] Phone: [phone]   Member Address: [address] [place] [id number]   Discharge Date: 03/15/2022 Level of Care at Discharge:    Facility: [facility] Staff Completing Form: Olivia LINDSAY RN [employer]   If the member is being discharged directly to a residential or extended care program, please specify the type below.   __Private Child-Caring Facility (PCC) Residential/Group Home   __Private Child-Caring Facility (PCC) Therapeutic Foster Care   __Residential Treatment Facility (RTF)   __Psychiatric Residential Treatment Facility (PRTF I or II)   __Long-Term Acute Inpatient Hospital Services or Extended Care Unit (ECU)   __Other (please specify):    Brief discharge summary of treatment received (for follow up by the case management team): D/C clinical with list of medications and follow up appts given to patient upon discharge.     BRIEF SUMMARY OF RECOMMENDATIONS FOR ONGOING TREATMENT     Discharged to where:    Discharge diagnoses: F 19.20   Axis I:    Axis II:    Axis III:    Axis IV:    Axis V:    Does the member understand his/her DX?  Yes          Medication     Dose     Schedule Supply/  Quantity  Given at Discharge RX Provided  Yes/No  If Rx Provided, Quantity RX Prior Auth Required  Yes/No Prior Auth  Completed                                                                                             Does the member understand the reason for taking these medications? Yes                                                           FOLLOW-UP APPOINTMENTS   Please schedule within 7 days of discharge and provide appointment details for all referred services.    PCP/Other Providers Involved in Treatment:    Appointment Type: In Pt Rehab Provider Name: [facility]  Recovery  Provider Phone: [phone] Appointment Date: 03/15/2022 Appointment Time: to return at this discharge     Assessment   (new to OP services)        Case Management    Is the member already enrolled in case management?  Yes/No  If yes, date the CM was notified:    If no, was the CM referral offered?  Yes/No  Accepted? Yes/No    Is the Release of Information in the chart? Yes/No:      Medication Management (for member discharged with psychiatric medications):      A&D Treatment (for member with substance abuse/   dependence in the past year):      Medical Condition (for member with a medical condition):    Other recommended treatment:    Do you have any concerns about the discharge plan?  No    If yes, explain:    Was the member involved in the discharge planning?  Yes    If no, explain:    Was a copy of the discharge plan provided to the member?  Yes    If no, explain:

## 2022-03-15 NOTE — NURSING NOTE
Pt requested meds for Anxiety and sleep. PRN Atarax & Trazodone given per order See [facility] system flowsheet for follow up.

## 2022-03-15 NOTE — DISCHARGE SUMMARY
":  1980  MRN:  [id number]  Visit Number:  [id number]      Date of Admission:3/12/2022   Date of Discharge:  3/15/2022    Discharge Diagnosis:  Principal Problem:    Alcohol use disorder, severe, dependence (HCC)    THC use disorder, severe, dependence (HCC)      Admission Diagnosis:  Chemical dependency (HCC) [F19.20]     HPI  Trent Tapia is a 41 y.o. male who was admitted on 3/12/2022 with complaints of alcohol use and withdrawals.  For details please see H&P dated 3/12/22.    Hospital Course  Patient is a 41 y.o. male presented with primarily alcohol use and withdrawal. The patient was admitted to the [facility] detox recovery unit for safety, further evaluation and treatment.  The patient was started on Ativan detox and he reported feeling better and didn't experience any severe withdrawals and didn't need prn doses of Ativan and by day three felt a lot better and decided he didn't need to continue in the detox and wanted to start the next phase of treatment at residential rehab facility in [place].  The patient was also able to take part in individual and group counseling sessions and work on appropriate coping skills.  The patient made steady improvement in his withdrawals and mood and expressed feeling more positive and hopeful about future. Sleep and appetite were improved.  The day of discharge the patient was calm, cooperative and pleasant. Mood was reported to be good, and denied SI/HI/AVH. Also reported no medication side effects.        Mental Status Exam upon discharge:   Mood \"good\"   Affect-congruent, appropriate, stable  Thought Content-goal directed, no delusional material present  Thought process-linear, organized.  Suicidality: No SI  Homicidality: No HI  Perception: No AH/VH    Procedures Performed         Consults:   Consults     No orders found from 2022 to 3/13/2022.          Pertinent Test Results:   Admission on 2022   Component Date Value Ref Range Status "   • Potassium 03/12/2022 3.8  3.5 - 5.2 mmol/L Final   • Magnesium 03/12/2022 1.3 (A) 1.6 - 2.6 mg/dL Final   • Potassium 03/13/2022 3.6  3.5 - 5.2 mmol/L Final   • Magnesium 03/13/2022 1.4 (A) 1.6 - 2.6 mg/dL Final   • Potassium 03/14/2022 3.7  3.5 - 5.2 mmol/L Final   • Magnesium 03/14/2022 1.6  1.6 - 2.6 mg/dL Final   • Potassium 03/15/2022 3.7  3.5 - 5.2 mmol/L Final   • Magnesium 03/15/2022 1.5 (A) 1.6 - 2.6 mg/dL Final   Admission on 03/11/2022, Discharged on 03/12/2022   Component Date Value Ref Range Status   • Glucose 03/11/2022 99  65 - 99 mg/dL Final   • BUN 03/11/2022 4 (A) 6 - 20 mg/dL Final   • Creatinine 03/11/2022 0.81  0.76 - 1.27 mg/dL Final   • Sodium 03/11/2022 137  136 - 145 mmol/L Final   • Potassium 03/11/2022 3.2 (A) 3.5 - 5.2 mmol/L Final   • Chloride 03/11/2022 99  98 - 107 mmol/L Final   • CO2 03/11/2022 25.4  22.0 - 29.0 mmol/L Final   • Calcium 03/11/2022 8.5 (A) 8.6 - 10.5 mg/dL Final   • Total Protein 03/11/2022 8.0  6.0 - 8.5 g/dL Final   • Albumin 03/11/2022 3.68  3.50 - 5.20 g/dL Final   • ALT (SGPT) 03/11/2022 32  1 - 41 U/L Final   • AST (SGOT) 03/11/2022 80 (A) 1 - 40 U/L Final   • Alkaline Phosphatase 03/11/2022 260 (A) 39 - 117 U/L Final   • Total Bilirubin 03/11/2022 2.1 (A) 0.0 - 1.2 mg/dL Final   • Globulin 03/11/2022 4.3  gm/dL Final   • A/G Ratio 03/11/2022 0.9  g/dL Final   • BUN/Creatinine Ratio 03/11/2022 4.9 (A) 7.0 - 25.0 Final   • Anion Gap 03/11/2022 12.6  5.0 - 15.0 mmol/L Final   • eGFR 03/11/2022 112.2  >60.0 mL/min/1.73 Final    National Kidney Foundation and American Society of Nephrology (ASN) Task Force recommended calculation based on the Chronic Kidney Disease Epidemiology Collaboration (CKD-EPI) equation refit without adjustment for race.   • Acetaminophen 03/11/2022 <5.0  0.0 - 30.0 mcg/mL Final   • Ethanol 03/11/2022 176 (A) 0 - 10 mg/dL Final   • Ethanol % 03/11/2022 0.176  % Final   • Salicylate 03/11/2022 <0.3  <=30.0 mg/dL Final   • THC, Screen,  Urine 03/11/2022 Positive (A) Negative Final   • Phencyclidine (PCP), Urine 03/11/2022 Negative  Negative Final   • Cocaine Screen, Urine 03/11/2022 Negative  Negative Final   • Methamphetamine, Ur 03/11/2022 Positive (A) Negative Final   • Opiate Screen 03/11/2022 Negative  Negative Final   • Amphetamine Screen, Urine 03/11/2022 Positive (A) Negative Final   • Benzodiazepine Screen, Urine 03/11/2022 Positive (A) Negative Final   • Tricyclic Antidepressants Screen 03/11/2022 Negative  Negative Final   • Methadone Screen, Urine 03/11/2022 Negative  Negative Final   • Barbiturates Screen, Urine 03/11/2022 Negative  Negative Final   • Oxycodone Screen, Urine 03/11/2022 Negative  Negative Final   • Propoxyphene Screen 03/11/2022 Negative  Negative Final   • Buprenorphine, Screen, Urine 03/11/2022 Negative  Negative Final   • Magnesium 03/11/2022 1.3 (A) 1.6 - 2.6 mg/dL Final   • COVID19 03/11/2022 Detected (A) Not Detected - Ref. Range Final   • Influenza A PCR 03/11/2022 Not Detected  Not Detected Final   • Influenza B PCR 03/11/2022 Not Detected  Not Detected Final   • Extra Tube 03/11/2022 Hold for add-ons.   Final    Auto resulted.   • Extra Tube 03/11/2022 hold for add-on   Final    Auto resulted   • Extra Tube 03/11/2022 Hold for add-ons.   Final    Auto resulted.   • Extra Tube 03/11/2022 hold for add-on   Final    Auto resulted   • WBC 03/11/2022 7.82  3.40 - 10.80 10*3/mm3 Final   • RBC 03/11/2022 4.33  4.14 - 5.80 10*6/mm3 Final   • Hemoglobin 03/11/2022 13.5  13.0 - 17.7 g/dL Final   • Hematocrit 03/11/2022 39.9  37.5 - 51.0 % Final   • MCV 03/11/2022 92.1  79.0 - 97.0 fL Final   • MCH 03/11/2022 31.2  26.6 - 33.0 pg Final   • MCHC 03/11/2022 33.8  31.5 - 35.7 g/dL Final   • RDW 03/11/2022 16.1 (A) 12.3 - 15.4 % Final   • RDW-SD 03/11/2022 54.2 (A) 37.0 - 54.0 fl Final   • MPV 03/11/2022 12.0  6.0 - 12.0 fL Final   • Platelets 03/11/2022 89 (A) 140 - 450 10*3/mm3 Final   • Neutrophil % 03/11/2022 55.8  42.7 -  76.0 % Final   • Lymphocyte % 03/11/2022 29.0  19.6 - 45.3 % Final   • Monocyte % 03/11/2022 13.3 (A) 5.0 - 12.0 % Final   • Eosinophil % 03/11/2022 0.4  0.3 - 6.2 % Final   • Basophil % 03/11/2022 1.2  0.0 - 1.5 % Final   • Immature Grans % 03/11/2022 0.3  0.0 - 0.5 % Final   • Neutrophils, Absolute 03/11/2022 4.37  1.70 - 7.00 10*3/mm3 Final   • Lymphocytes, Absolute 03/11/2022 2.27  0.70 - 3.10 10*3/mm3 Final   • Monocytes, Absolute 03/11/2022 1.04 (A) 0.10 - 0.90 10*3/mm3 Final   • Eosinophils, Absolute 03/11/2022 0.03  0.00 - 0.40 10*3/mm3 Final   • Basophils, Absolute 03/11/2022 0.09  0.00 - 0.20 10*3/mm3 Final   • Immature Grans, Absolute 03/11/2022 0.02  0.00 - 0.05 10*3/mm3 Final   • nRBC 03/11/2022 0.0  0.0 - 0.2 /100 WBC Final   • QT Interval 03/11/2022 402  ms Final   • QTC Interval 03/11/2022 472  ms Final   • Ethanol 03/12/2022 101 (A) 0 - 10 mg/dL Final   • Ethanol % 03/12/2022 0.101  % Final   Admission on 03/05/2022, Discharged on 03/06/2022   Component Date Value Ref Range Status   • COVID19 03/05/2022 Detected (A) Not Detected - Ref. Range Final   • Influenza A PCR 03/05/2022 Not Detected  Not Detected Final   • Influenza B PCR 03/05/2022 Not Detected  Not Detected Final   • Glucose 03/05/2022 96  65 - 99 mg/dL Final   • BUN 03/05/2022 3 (A) 6 - 20 mg/dL Final   • Creatinine 03/05/2022 0.81  0.76 - 1.27 mg/dL Final   • Sodium 03/05/2022 142  136 - 145 mmol/L Final   • Potassium 03/05/2022 3.6  3.5 - 5.2 mmol/L Final   • Chloride 03/05/2022 105  98 - 107 mmol/L Final   • CO2 03/05/2022 25.7  22.0 - 29.0 mmol/L Final   • Calcium 03/05/2022 8.8  8.6 - 10.5 mg/dL Final   • Total Protein 03/05/2022 8.7 (A) 6.0 - 8.5 g/dL Final   • Albumin 03/05/2022 4.03  3.50 - 5.20 g/dL Final   • ALT (SGPT) 03/05/2022 26  1 - 41 U/L Final   • AST (SGOT) 03/05/2022 68 (A) 1 - 40 U/L Final   • Alkaline Phosphatase 03/05/2022 270 (A) 39 - 117 U/L Final   • Total Bilirubin 03/05/2022 1.4 (A) 0.0 - 1.2 mg/dL Final   •  Globulin 03/05/2022 4.7  gm/dL Final   • A/G Ratio 03/05/2022 0.9  g/dL Final   • BUN/Creatinine Ratio 03/05/2022 3.7 (A) 7.0 - 25.0 Final   • Anion Gap 03/05/2022 11.3  5.0 - 15.0 mmol/L Final   • eGFR 03/05/2022 112.2  >60.0 mL/min/1.73 Final    National Kidney Foundation and American Society of Nephrology (ASN) Task Force recommended calculation based on the Chronic Kidney Disease Epidemiology Collaboration (CKD-EPI) equation refit without adjustment for race.   • Color, UA 03/05/2022 Yellow  Yellow, Straw Final   • Appearance, UA 03/05/2022 Clear  Clear Final   • pH, UA 03/05/2022 >=9.0 (A) 5.0 - 8.0 Final   • Specific Gravity, UA 03/05/2022 1.012  1.005 - 1.030 Final   • Glucose, UA 03/05/2022 Negative  Negative Final   • Ketones, UA 03/05/2022 Negative  Negative Final   • Bilirubin, UA 03/05/2022 Negative  Negative Final   • Blood, UA 03/05/2022 Negative  Negative Final   • Protein, UA 03/05/2022 Negative  Negative Final   • Leuk Esterase, UA 03/05/2022 Negative  Negative Final   • Nitrite, UA 03/05/2022 Negative  Negative Final   • Urobilinogen, UA 03/05/2022 4.0 E.U./dL (A) 0.2 - 1.0 E.U./dL Final   • Ethanol 03/05/2022 368 (A) 0 - 10 mg/dL Final   • Ethanol % 03/05/2022 0.368  % Final   • THC, Screen, Urine 03/05/2022 Positive (A) Negative Final   • Phencyclidine (PCP), Urine 03/05/2022 Negative  Negative Final   • Cocaine Screen, Urine 03/05/2022 Negative  Negative Final   • Methamphetamine, Ur 03/05/2022 Negative  Negative Final   • Opiate Screen 03/05/2022 Negative  Negative Final   • Amphetamine Screen, Urine 03/05/2022 Negative  Negative Final   • Benzodiazepine Screen, Urine 03/05/2022 Positive (A) Negative Final   • Tricyclic Antidepressants Screen 03/05/2022 Negative  Negative Final   • Methadone Screen, Urine 03/05/2022 Negative  Negative Final   • Barbiturates Screen, Urine 03/05/2022 Negative  Negative Final   • Oxycodone Screen, Urine 03/05/2022 Negative  Negative Final   • Propoxyphene Screen  03/05/2022 Negative  Negative Final   • Buprenorphine, Screen, Urine 03/05/2022 Negative  Negative Final   • Magnesium 03/05/2022 2.0  1.6 - 2.6 mg/dL Final   • WBC 03/05/2022 6.61  3.40 - 10.80 10*3/mm3 Final   • RBC 03/05/2022 4.58  4.14 - 5.80 10*6/mm3 Final   • Hemoglobin 03/05/2022 14.2  13.0 - 17.7 g/dL Final   • Hematocrit 03/05/2022 43.0  37.5 - 51.0 % Final   • MCV 03/05/2022 93.9  79.0 - 97.0 fL Final   • MCH 03/05/2022 31.0  26.6 - 33.0 pg Final   • MCHC 03/05/2022 33.0  31.5 - 35.7 g/dL Final   • RDW 03/05/2022 15.2  12.3 - 15.4 % Final   • RDW-SD 03/05/2022 51.8  37.0 - 54.0 fl Final   • MPV 03/05/2022 11.9  6.0 - 12.0 fL Final   • Platelets 03/05/2022 101 (A) 140 - 450 10*3/mm3 Final   • Neutrophil % 03/05/2022 45.6  42.7 - 76.0 % Final   • Lymphocyte % 03/05/2022 38.0  19.6 - 45.3 % Final   • Monocyte % 03/05/2022 13.8 (A) 5.0 - 12.0 % Final   • Eosinophil % 03/05/2022 0.8  0.3 - 6.2 % Final   • Basophil % 03/05/2022 1.5  0.0 - 1.5 % Final   • Immature Grans % 03/05/2022 0.3  0.0 - 0.5 % Final   • Neutrophils, Absolute 03/05/2022 3.02  1.70 - 7.00 10*3/mm3 Final   • Lymphocytes, Absolute 03/05/2022 2.51  0.70 - 3.10 10*3/mm3 Final   • Monocytes, Absolute 03/05/2022 0.91 (A) 0.10 - 0.90 10*3/mm3 Final   • Eosinophils, Absolute 03/05/2022 0.05  0.00 - 0.40 10*3/mm3 Final   • Basophils, Absolute 03/05/2022 0.10  0.00 - 0.20 10*3/mm3 Final   • Immature Grans, Absolute 03/05/2022 0.02  0.00 - 0.05 10*3/mm3 Final   • nRBC 03/05/2022 0.0  0.0 - 0.2 /100 WBC Final   • Ethanol 03/05/2022 214 (A) 0 - 10 mg/dL Final   • Ethanol % 03/05/2022 0.214  % Final   • Ethanol 03/06/2022 106 (A) 0 - 10 mg/dL Final   • Ethanol % 03/06/2022 0.106  % Final   • Ethanol 03/06/2022 81 (A) 0 - 10 mg/dL Final   • Ethanol % 03/06/2022 0.081  % Final   Admission on 03/01/2022, Discharged on 03/01/2022   Component Date Value Ref Range Status   • Glucose 03/01/2022 95  65 - 99 mg/dL Final   • BUN 03/01/2022 5 (A) 6 - 20 mg/dL Final    • Creatinine 03/01/2022 0.78  0.76 - 1.27 mg/dL Final   • Sodium 03/01/2022 138  136 - 145 mmol/L Final   • Potassium 03/01/2022 3.9  3.5 - 5.2 mmol/L Final   • Chloride 03/01/2022 105  98 - 107 mmol/L Final   • CO2 03/01/2022 24.3  22.0 - 29.0 mmol/L Final   • Calcium 03/01/2022 8.7  8.6 - 10.5 mg/dL Final   • Total Protein 03/01/2022 7.4  6.0 - 8.5 g/dL Final   • Albumin 03/01/2022 3.40 (A) 3.50 - 5.20 g/dL Final   • ALT (SGPT) 03/01/2022 15  1 - 41 U/L Final   • AST (SGOT) 03/01/2022 30  1 - 40 U/L Final   • Alkaline Phosphatase 03/01/2022 260 (A) 39 - 117 U/L Final   • Total Bilirubin 03/01/2022 1.4 (A) 0.0 - 1.2 mg/dL Final   • Globulin 03/01/2022 4.0  gm/dL Final   • A/G Ratio 03/01/2022 0.9  g/dL Final   • BUN/Creatinine Ratio 03/01/2022 6.4 (A) 7.0 - 25.0 Final   • Anion Gap 03/01/2022 8.7  5.0 - 15.0 mmol/L Final   • eGFR 03/01/2022 113.5  >60.0 mL/min/1.73 Final    National Kidney Foundation and American Society of Nephrology (ASN) Task Force recommended calculation based on the Chronic Kidney Disease Epidemiology Collaboration (CKD-EPI) equation refit without adjustment for race.   • Color, UA 03/01/2022 Yellow  Yellow, Straw Final   • Appearance, UA 03/01/2022 Clear  Clear Final   • pH, UA 03/01/2022 >=9.0 (A) 5.0 - 8.0 Final   • Specific Gravity, UA 03/01/2022 1.016  1.005 - 1.030 Final   • Glucose, UA 03/01/2022 Negative  Negative Final   • Ketones, UA 03/01/2022 Negative  Negative Final   • Bilirubin, UA 03/01/2022 Negative  Negative Final   • Blood, UA 03/01/2022 Negative  Negative Final   • Protein, UA 03/01/2022 Negative  Negative Final   • Leuk Esterase, UA 03/01/2022 Negative  Negative Final   • Nitrite, UA 03/01/2022 Negative  Negative Final   • Urobilinogen, UA 03/01/2022 2.0 E.U./dL (A) 0.2 - 1.0 E.U./dL Final   • Ethanol 03/01/2022 <10  0 - 10 mg/dL Final   • Ethanol % 03/01/2022 <0.010  % Final   • THC, Screen, Urine 03/01/2022 Positive (A) Negative Final   • Phencyclidine (PCP), Urine  03/01/2022 Negative  Negative Final   • Cocaine Screen, Urine 03/01/2022 Negative  Negative Final   • Methamphetamine, Ur 03/01/2022 Negative  Negative Final   • Opiate Screen 03/01/2022 Negative  Negative Final   • Amphetamine Screen, Urine 03/01/2022 Negative  Negative Final   • Benzodiazepine Screen, Urine 03/01/2022 Positive (A) Negative Final   • Tricyclic Antidepressants Screen 03/01/2022 Negative  Negative Final   • Methadone Screen, Urine 03/01/2022 Negative  Negative Final   • Barbiturates Screen, Urine 03/01/2022 Negative  Negative Final   • Oxycodone Screen, Urine 03/01/2022 Negative  Negative Final   • Propoxyphene Screen 03/01/2022 Negative  Negative Final   • Buprenorphine, Screen, Urine 03/01/2022 Negative  Negative Final   • Magnesium 03/01/2022 1.7  1.6 - 2.6 mg/dL Final   • COVID19 03/01/2022 Detected (A) Not Detected - Ref. Range Final   • Influenza A PCR 03/01/2022 Not Detected  Not Detected Final   • Influenza B PCR 03/01/2022 Not Detected  Not Detected Final   • WBC 03/01/2022 4.57  3.40 - 10.80 10*3/mm3 Final   • RBC 03/01/2022 4.31  4.14 - 5.80 10*6/mm3 Final   • Hemoglobin 03/01/2022 13.1  13.0 - 17.7 g/dL Final   • Hematocrit 03/01/2022 40.3  37.5 - 51.0 % Final   • MCV 03/01/2022 93.5  79.0 - 97.0 fL Final   • MCH 03/01/2022 30.4  26.6 - 33.0 pg Final   • MCHC 03/01/2022 32.5  31.5 - 35.7 g/dL Final   • RDW 03/01/2022 14.4  12.3 - 15.4 % Final   • RDW-SD 03/01/2022 49.4  37.0 - 54.0 fl Final   • MPV 03/01/2022 12.0  6.0 - 12.0 fL Final   • Platelets 03/01/2022 75 (A) 140 - 450 10*3/mm3 Final   • Neutrophil % 03/01/2022 49.0  42.7 - 76.0 % Final   • Lymphocyte % 03/01/2022 31.7  19.6 - 45.3 % Final   • Monocyte % 03/01/2022 17.3 (A) 5.0 - 12.0 % Final   • Eosinophil % 03/01/2022 0.9  0.3 - 6.2 % Final   • Basophil % 03/01/2022 0.9  0.0 - 1.5 % Final   • Immature Grans % 03/01/2022 0.2  0.0 - 0.5 % Final   • Neutrophils, Absolute 03/01/2022 2.24  1.70 - 7.00 10*3/mm3 Final   • Lymphocytes,  Absolute 03/01/2022 1.45  0.70 - 3.10 10*3/mm3 Final   • Monocytes, Absolute 03/01/2022 0.79  0.10 - 0.90 10*3/mm3 Final   • Eosinophils, Absolute 03/01/2022 0.04  0.00 - 0.40 10*3/mm3 Final   • Basophils, Absolute 03/01/2022 0.04  0.00 - 0.20 10*3/mm3 Final   • Immature Grans, Absolute 03/01/2022 0.01  0.00 - 0.05 10*3/mm3 Final   • nRBC 03/01/2022 0.0  0.0 - 0.2 /100 WBC Final   • Extra Tube 03/01/2022 Hold for add-ons.   Final    Auto resulted.   • Extra Tube 03/01/2022 hold for add-on   Final    Auto resulted   • Extra Tube 03/01/2022 Hold for add-ons.   Final    Auto resulted.   • Extra Tube 03/01/2022 hold for add-on   Final    Auto resulted        Condition on Discharge:  improved    Vital Signs  Temp:  [98.2 °F (36.8 °C)-98.5 °F (36.9 °C)] 98.2 °F (36.8 °C)  Heart Rate:  [79-89] 87  Resp:  [16-20] 16  BP: (106-126)/(61-84) 119/63      Discharge Disposition:  Home or Self Care    Discharge Medications:     Discharge Medications      Patient Not Prescribed Medications Upon Discharge         Discharge Diet: Regular     Activity at Discharge: As tolerated     Follow-up Appointments  [facility]    Time spent in discharge: < 30 min    Clinician:   Queenie Markham MD  03/15/22  14:06 EDT

## 2022-03-15 NOTE — PLAN OF CARE
Problem: Adult Behavioral Health Plan of Care  Goal: Plan of Care Review  Outcome: Adequate for Care Transition  Flowsheets  Taken 3/15/2022 1407  Plan of Care Reviewed With: patient  Patient Agreement with Plan of Care: agrees  Taken 3/15/2022 0829  Plan of Care Reviewed With: patient  Patient Agreement with Plan of Care: agrees  Goal: Patient-Specific Goal (Individualization)  Outcome: Adequate for Care Transition  Goal: Adheres to Safety Considerations for Self and Others  Outcome: Adequate for Care Transition  Intervention: Develop and Maintain Individualized Safety Plan  Recent Flowsheet Documentation  Taken 3/15/2022 1000 by Jud Peña RN  Safety Measures:   safety plan reviewed   environmental rounds completed  Taken 3/15/2022 0800 by Jud Peña RN  Safety Measures:   environmental rounds completed   safety plan reviewed  Goal: Absence of New-Onset Illness or Injury  Outcome: Adequate for Care Transition  Intervention: Identify and Manage Fall Risk  Recent Flowsheet Documentation  Taken 3/15/2022 1000 by Jud Peña RN  Safety Measures:   safety plan reviewed   environmental rounds completed  Taken 3/15/2022 0800 by Jud Peña RN  Safety Measures:   environmental rounds completed   safety plan reviewed  Goal: Optimized Coping Skills in Response to Life Stressors  Outcome: Adequate for Care Transition  Intervention: Promote Effective Coping Strategies  Recent Flowsheet Documentation  Taken 3/15/2022 0829 by Jud Peña RN  Supportive Measures: active listening utilized  Goal: Develops/Participates in Therapeutic [place] to Support Successful Transition  Outcome: Adequate for Care Transition  Intervention: Foster Therapeutic [place]  Recent Flowsheet Documentation  Taken 3/15/2022 0829 by Jud Peña RN  Trust Relationship/Rapport: care explained   Goal Outcome Evaluation:  Plan of Care Reviewed With: patient  Patient Agreement with Plan of Care:  agrees        Outcome Evaluation: client slept a lot today. calm and cooperative.

## 2022-03-15 NOTE — PLAN OF CARE
Goal Outcome Evaluation:  Plan of Care Reviewed With: patient  Patient Agreement with Plan of Care: agrees     Progress: improving   Pt reports good appetite and poor sleep. Pt rates anx 3/10 and depression 0/10. Pt denies SI/HI/AVH. Pt reports no pain and rates cravings 3/10.